# Patient Record
Sex: FEMALE | Race: WHITE | URBAN - METROPOLITAN AREA
[De-identification: names, ages, dates, MRNs, and addresses within clinical notes are randomized per-mention and may not be internally consistent; named-entity substitution may affect disease eponyms.]

---
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
## 2018-02-23 ENCOUNTER — TELEPHONE (OUTPATIENT)
Dept: FAMILY MEDICINE | Facility: CLINIC | Age: 18
End: 2018-02-23

## 2018-02-23 NOTE — TELEPHONE ENCOUNTER
Reason for Call:  Medication or medication refill:    Do you use a [facility]?  Name of the pharmacy and phone number for the current request:  [facility] - [phone]    Name of the medication requested: vyvance  40 mg    Other request: na    Can we leave a detailed message on this number? YES    Phone number patient can be reached at: Cell number on file:  [phone]  Telephone Information:   Mobile        Best Time: anytime,  Call Mom when ready for pickup    Call taken on 2/23/2018 at 2:23 PM by Massiel Abbott

## 2018-02-23 NOTE — TELEPHONE ENCOUNTER
Pt's Mother called stating that the patient would like to take Vyvanse again, states that the Adderall is not working well enough for the patient in college.  Mother staets that she believes that they spoke to Dr Sullivan about this last year    LOV 8/9/17    ASSESSMENT/PLAN:   Adilene was seen today for recheck medication.     Diagnoses and all orders for this visit:     Attention deficit hyperactivity disorder (ADHD), unspecified ADHD type  -     amphetamine-dextroamphetamine (ADDERALL) 20 MG per tablet; Take 1 tablet (20 mg) by mouth 2 times daily  -     amphetamine-dextroamphetamine (ADDERALL) 20 MG per tablet; Take 1 tablet (20 mg) by mouth 2 times daily  -     amphetamine-dextroamphetamine (ADDERALL) 20 MG per tablet; Take 1 tablet (20 mg) by mouth 2 times daily     after switching from vyvanse 10mg adderall not working and feeling wearing out easily, will have her tot ry 20mg adderall for next 4-6 months and recheck   Ok to refill via phone call request in 3 months      FUTURE APPOINTMENTS:       - Follow-up visit in 6 months      A total time of 27 minutes was spent with the patient today. Of this time More than 50% was spent counseling and coordinating of care of the above concerns.  .gilles advise on refill/restart of Vyvanse    Jory Hurst RN  [employer]

## 2018-02-25 NOTE — TELEPHONE ENCOUNTER
She is due for q3thaus med check anyhow. Please help her to schedule with me   I cannot change prescription  controlled substance without seeing patient

## 2018-02-26 NOTE — TELEPHONE ENCOUNTER
Mom notified with providers response and patient did schedule to establish care with female provider.  Daphney Goodrich RN - Triage  [facility]

## 2018-03-05 ENCOUNTER — OFFICE VISIT (OUTPATIENT)
Dept: FAMILY MEDICINE | Facility: CLINIC | Age: 18
End: 2018-03-05
Payer: COMMERCIAL

## 2018-03-05 VITALS
HEIGHT: 67 IN | BODY MASS INDEX: 22.29 KG/M2 | WEIGHT: 142 LBS | HEART RATE: 94 BPM | SYSTOLIC BLOOD PRESSURE: 104 MMHG | TEMPERATURE: 97.7 F | DIASTOLIC BLOOD PRESSURE: 54 MMHG

## 2018-03-05 DIAGNOSIS — F90.9 ATTENTION DEFICIT HYPERACTIVITY DISORDER (ADHD), UNSPECIFIED ADHD TYPE: ICD-10-CM

## 2018-03-05 DIAGNOSIS — Z79.899 CONTROLLED SUBSTANCE AGREEMENT SIGNED: Primary | ICD-10-CM

## 2018-03-05 DIAGNOSIS — Z30.011 OCP (ORAL CONTRACEPTIVE PILLS) INITIATION: ICD-10-CM

## 2018-03-05 PROCEDURE — 99214 OFFICE O/P EST MOD 30 MIN: CPT | Performed by: PHYSICIAN ASSISTANT

## 2018-03-05 RX ORDER — DROSPIRENONE AND ETHINYL ESTRADIOL 0.03MG-3MG
1 KIT ORAL DAILY
Qty: 90 TABLET | Refills: 3 | Status: CANCELLED | OUTPATIENT
Start: 2018-03-05

## 2018-03-05 RX ORDER — DEXTROAMPHETAMINE SACCHARATE, AMPHETAMINE ASPARTATE, DEXTROAMPHETAMINE SULFATE AND AMPHETAMINE SULFATE 5; 5; 5; 5 MG/1; MG/1; MG/1; MG/1
20 TABLET ORAL 2 TIMES DAILY
Qty: 60 TABLET | Refills: 0 | Status: CANCELLED | OUTPATIENT
Start: 2018-03-05

## 2018-03-05 RX ORDER — LISDEXAMFETAMINE DIMESYLATE 30 MG/1
30 CAPSULE ORAL EVERY MORNING
Qty: 30 CAPSULE | Refills: 0 | Status: SHIPPED | OUTPATIENT
Start: 2018-05-05 | End: 2018-05-22

## 2018-03-05 RX ORDER — LISDEXAMFETAMINE DIMESYLATE 30 MG/1
30 CAPSULE ORAL EVERY MORNING
Qty: 30 CAPSULE | Refills: 0 | Status: SHIPPED | OUTPATIENT
Start: 2018-03-05 | End: 2018-03-05

## 2018-03-05 RX ORDER — LISDEXAMFETAMINE DIMESYLATE 30 MG/1
30 CAPSULE ORAL EVERY MORNING
Qty: 30 CAPSULE | Refills: 0 | Status: SHIPPED | OUTPATIENT
Start: 2018-04-05 | End: 2018-05-22

## 2018-03-05 NOTE — NURSING NOTE
"Chief Complaint   Patient presents with     Establish Care     Recheck Medication       Initial /54 (BP Location: Left arm, Patient Position: Chair, Cuff Size: Adult Regular)  Pulse 94  Temp 97.7  F (36.5  C) (Tympanic)  Ht 5' 7.25\" (1.708 m)  Wt 142 lb (64.4 kg)  LMP 02/14/2018  BMI 22.08 kg/m2 Estimated body mass index is 22.08 kg/(m^2) as calculated from the following:    Height as of this encounter: 5' 7.25\" (1.708 m).    Weight as of this encounter: 142 lb (64.4 kg).  Medication Reconciliation: complete  "

## 2018-03-05 NOTE — LETTER
[facility]    [facility]18    Patient: Adilene Bergman  YOB: 2000  Medical Record Number: [id number]                                                                  Controlled Substance Agreement  I understand that my care provider has prescribed controlled substances (narcotics, tranquilizers, and/or stimulants) to help manage my condition(s).  I am taking this medicine to help me function or work.  I know that this is strong medicine.  It could have serious side effects and even cause a dependency on the drug.  If I stop these medicines suddenly, I could have severe withdrawal symptoms.    The risks, benefits, and side effects of these medication(s) were explained to me.  I agree that:  1. I will take part in other treatments as advised by my provider.  This may be psychiatry or counseling, physical therapy, behavioral therapy, group treatment, or a referral to a pain clinic.  I will reduce or stop my medicine when my provider tells me to do so.   2. I will take my medicines as prescribed.  I will not change the dose or schedule unless my provider tells me to.  There will be no refills if I  run out early.   I may be contacted at any time without warning and asked to complete a drug test or pill count.   3. I will keep all my appointments at the clinic.  If I miss appointments or fail to follow instructions, my provider may stop my medicine.  4. I will not ask other providers to prescribe controlled substances. And I will not accept controlled substances from other people. If I need another prescribed controlled substance for a new reason, I will notify my provider within one business day.  5. If I enroll in the [place] Medical Marijuana program, I will tell my provider.  I will also sign an agreement to share my medical records with my provider.  6. I will use one pharmacy to fill all of my controlled substance prescriptions.  If my prescription is mailed to my pharmacy, it may take  5 to 7 days for my medicine to be ready.  7. I understand that my provider, clinic care team, and pharmacy can track controlled substance prescriptions from other providers through a central database (prescription monitoring program).  8. I will bring in my list of medications (or my medicine bottles) each time I come to the clinic.  REV- 04/2016                                                                                                                                            Page 1 of 2      [facility] [place]    03/05/18    Patient: Adilene Bergman  YOB: 2000  Medical Record Number: [id number]    9. Refills of controlled substances will be made only during office hours.  It is up to me to make sure that I do not run out of my medicines on weekends or holidays.    10. I am responsible for my prescriptions.  If the medicine is lost or stolen, it will not be replaced.   I also agree not to share these medicines with anyone.  11. I agree to not use ANY illegal or recreational drugs.  This includes marijuana, cocaine, bath salts or other drugs.  I agree not to use alcohol unless my provider says I may.  I agree to give urine samples whenever asked.  If I fail to give a urine sample, the provider may stop my medicine.     12. I will tell my nurse or provider right away if I become pregnant or have a new medical problem treated outside of [facility].  13. I understand that this medicine can affect my thinking and judgment.  It may be unsafe for me to drive, use machinery and do dangerous tasks.  I will not do any of these things until I know how the medicine affects me.  If my dose changes, I will wait to see how it affects me.  I will contact my provider if I have concerns about medicine side effects.  I understand that if I do not follow any of the conditions above, my prescriptions or treatment may be stopped.    I agree that my provider, clinic care team, and pharmacy may work with  any city, state or federal law enforcement agency that investigates the misuse, sale, or other diversion of my controlled medicine. I will allow my provider to discuss my care with or share a copy of this agreement with any other treating provider, pharmacy or emergency room where I receive care.  I agree to give up (waive) any right of privacy or confidentiality with respect to these authorizations.   I have read this agreement and have asked questions about anything I did not understand.   ___________________________________    ___________________________  Patient Signature                                                           Date and Time  ___________________________________     ____________________________  Witness                                                                            Date and Time  ___________________________________  Scott Hopson PA-C  REV-  04/2016                                                                                                                                                                 Page 2 of 2

## 2018-03-05 NOTE — MR AVS SNAPSHOT
"              After Visit Summary   3/5/2018    Adilene Bergman    MRN: [id number]           Patient Information     Date Of Birth          2000        Visit Information        Provider Department      3/5/2018 10:40 AM Scott Hopson PA-C [facility] [place]        Today's Diagnoses     Controlled substance agreement signed    -  1    Attention deficit hyperactivity disorder (ADHD), unspecified ADHD type        OCP (oral contraceptive pills) initiation           Follow-ups after your visit        Follow-up notes from your care team     Return in about 6 months (around 9/5/2018) for Routine Visit.      Who to contact     If you have questions or need follow up information about today's clinic visit or your schedule please contact [facility] directly at [phone].  Normal or non-critical lab and imaging results will be communicated to you by MyChart, letter or phone within 4 business days after the clinic has received the results. If you do not hear from us within 7 days, please contact the clinic through MyChart or phone. If you have a critical or abnormal lab result, we will notify you by phone as soon as possible.  Submit refill requests through [facility] or call your pharmacy and they will forward the refill request to us. Please allow 3 business days for your refill to be completed.          Additional Information About Your Visit        MyChart Information     [facility] lets you send messages to your doctor, view your test results, renew your prescriptions, schedule appointments and more. To sign up, go to [email or web address] . Click on \"Log in\" on the left side of the screen, which will take you to the Welcome page. Then click on \"Sign up Now\" on the right side of the page.     You will be asked to enter the access code listed below, as well as some personal information. Please follow the directions to create your username and password.     Your access code is: " "[email or web address]  Expires: 6/3/2018 10:54 AM     Your access code will  in 90 days. If you need help or a new code, please call your [place] clinic or [phone].        Care EveryWhere ID     This is your Care EveryWhere ID. This could be used by other organizations to access your [facility] medical records  [id number]        Your Vitals Were     Pulse Temperature Height Last Period BMI (Body Mass Index)       94 97.7  F (36.5  C) (Tympanic) 5' 7.25\" (1.708 m) 2018 22.08 kg/m2        Blood Pressure from Last 3 Encounters:   18 104/54   17 110/60   17 101/65    Weight from Last 3 Encounters:   18 142 lb (64.4 kg) (73 %)*   17 143 lb (64.9 kg) (75 %)*   17 142 lb (64.4 kg) (75 %)*     * Growth percentiles are based on [facility] 2-20 Years data.              Today, you had the following     No orders found for display         Today's Medication Changes          These changes are accurate as of 3/5/18 10:54 AM.  If you have any questions, ask your nurse or doctor.               Start taking these medicines.        Dose/Directions    * lisdexamfetamine 30 MG capsule   Commonly known as:  VYVANSE   Used for:  Attention deficit hyperactivity disorder (ADHD), unspecified ADHD type   Started by:  Scott Hopson PA-C        Dose:  30 mg   Start taking on:  2018   Take 1 capsule (30 mg) by mouth every morning   Quantity:  30 capsule   Refills:  0       * lisdexamfetamine 30 MG capsule   Commonly known as:  VYVANSE   Used for:  Attention deficit hyperactivity disorder (ADHD), unspecified ADHD type   Started by:  Scott Hopson PA-C        Dose:  30 mg   Start taking on:  2018   Take 1 capsule (30 mg) by mouth every morning   Quantity:  30 capsule   Refills:  0       * Notice:  This list has 2 medication(s) that are the same as other medications prescribed for you. Read the directions carefully, and ask your doctor or other care provider to review them with you. "      Stop taking these medicines if you haven't already. Please contact your care team if you have questions.     amphetamine-dextroamphetamine 20 MG per tablet   Commonly known as:  ADDERALL   Stopped by:  Scott Hopson PA-C                Where to get your medicines      Some of these will need a paper prescription and others can be bought over the counter.  Ask your nurse if you have questions.     Bring a paper prescription for each of these medications     lisdexamfetamine 30 MG capsule    lisdexamfetamine 30 MG capsule                Primary Care Provider Office Phone # Fax #    Scott Hopson PA-C [phone]       [address]  [place] [address]        Equal Access to Services     [facility]: Hadii aad ku hadasho Soomaali, waaxda luqadaha, qaybta kaalmada adeegyada, waxay carliin pauletten ale harrell . So [facility] [phone].    ATENCIÓN: Si habla español, tiene a phan disposición servicios gratuitos de asistencia lingüística. Ll[facility] [phone].    We comply with applicable federal civil rights laws and [place] laws. We do not discriminate on the basis of race, color, national origin, age, disability, sex, sexual orientation, or gender identity.            Thank you!     Thank you for choosing [facility] [place]  for your care. Our goal is always to provide you with excellent care. Hearing back from our patients is one way we can continue to improve our services. Please take a few minutes to complete the written survey that you may receive in the mail after your visit with us. Thank you!             Your Updated Medication List - Protect others around you: Learn how to safely use, store and throw away your medicines at www.disposemymeds.org.          This list is accurate as of 3/5/18 10:54 AM.  Always use your most recent med list.                   Brand Name Dispense Instructions for use Diagnosis    drospirenone-ethinyl estradiol 3-0.03  MG per tablet    JIM    90 tablet    Take 1 tablet by mouth daily    OCP (oral contraceptive pills) initiation       * lisdexamfetamine 30 MG capsule   Start taking on:  4/5/2018    VYVANSE    30 capsule    Take 1 capsule (30 mg) by mouth every morning    Attention deficit hyperactivity disorder (ADHD), unspecified ADHD type       * lisdexamfetamine 30 MG capsule   Start taking on:  5/5/2018    VYVANSE    30 capsule    Take 1 capsule (30 mg) by mouth every morning    Attention deficit hyperactivity disorder (ADHD), unspecified ADHD type       * Notice:  This list has 2 medication(s) that are the same as other medications prescribed for you. Read the directions carefully, and ask your doctor or other care provider to review them with you.

## 2018-03-05 NOTE — PROGRESS NOTES
SUBJECTIVE:   Adilene Bergman is a 17 year old female who presents to clinic today for the following health issues:      Medication Followup of Adderall, but would like to go back to Vyvansse     Taking Medication as prescribed: yes    Side Effects:  Affects mood / personality - irritable     Medication Helping Symptoms:  yes     Adilene has a PMH ADHD diagnosed by a psychologist while in .  She is currently attending [employer] and is a freshman.  She is taking Adderall 20 mg BID , but feels that this makes her irritable and she does not sleep well since increasing the Adderall dose.  She was previously taking Vyvanse which she liked better, but her insurance changed at the time and she was switched to Adderall. She is interested in changing back to Vyvanse as she did not have SE with this medication.         Problem list and histories reviewed & adjusted, as indicated.  Additional history: as documented    Patient Active Problem List   Diagnosis     Headache     ADD (attention deficit disorder)     Loss of weight     Past Surgical History:   Procedure Laterality Date     NO HISTORY OF SURGERY         Social History   Substance Use Topics     Smoking status: Never Smoker     Smokeless tobacco: Never Used     Alcohol use No     Family History   Problem Relation Age of Onset     CEREBROVASCULAR DISEASE No family hx of      Hypertension Mother      Other - See Comments Brother      ADD          Current Outpatient Prescriptions   Medication Sig Dispense Refill     [START ON 4/5/2018] lisdexamfetamine (VYVANSE) 30 MG capsule Take 1 capsule (30 mg) by mouth every morning 30 capsule 0     [START ON 5/5/2018] lisdexamfetamine (VYVANSE) 30 MG capsule Take 1 capsule (30 mg) by mouth every morning 30 capsule 0     drospirenone-ethinyl estradiol (JIM) 3-0.03 MG per tablet Take 1 tablet by mouth daily 90 tablet 3     No Known Allergies    Reviewed and updated as needed this visit by clinical staff       Reviewed and  "updated as needed this visit by Provider         ROS:  Constitutional, HEENT, cardiovascular, pulmonary, gi and gu systems are negative, except as otherwise noted.    OBJECTIVE:     /54 (BP Location: Left arm, Patient Position: Chair, Cuff Size: Adult Regular)  Pulse 94  Temp 97.7  F (36.5  C) (Tympanic)  Ht 5' 7.25\" (1.708 m)  Wt 142 lb (64.4 kg)  LMP 02/14/2018  BMI 22.08 kg/m2  Body mass index is 22.08 kg/(m^2).  GENERAL: healthy, alert and no distress  EYES: Eyes grossly normal to inspection  PSYCH: mentation appears normal, affect normal/bright    Diagnostic Test Results:  none     ASSESSMENT/PLAN:     1. Attention deficit hyperactivity disorder (ADHD), unspecified ADHD type  - lisdexamfetamine (VYVANSE) 30 MG capsule; Take 1 capsule (30 mg) by mouth every morning  Dispense: 30 capsule; Refill: 0  - lisdexamfetamine (VYVANSE) 30 MG capsule; Take 1 capsule (30 mg) by mouth every morning  Dispense: 30 capsule; Refill: 0    2. Controlled substance agreement signed   Switched to Vyvanse 30 mg daily, tapering instruction for Adderall Discussed today with patient. CSA signed today, 6 months follow up needed, 3 month phone call for refill ok    3. OCP (oral contraceptive pills) initiation  Taking consistently, no SE        See Patient Instructions    Scott Hopson PA-C  [facility]  "

## 2018-04-05 ENCOUNTER — TELEPHONE (OUTPATIENT)
Dept: FAMILY MEDICINE | Facility: CLINIC | Age: 18
End: 2018-04-05

## 2018-04-05 NOTE — TELEPHONE ENCOUNTER
She may need to establish with an MD here or in [place] for these scripts.  I will check with my supervising physician when he returns next week

## 2018-04-05 NOTE — TELEPHONE ENCOUNTER
Patients parents are going to take prescription back to [place] and fill it here- they will then take it back to patient before she runs out of the current script.    please disregard the message- no need to rewrite the prescription.    Clementina Lewis RN BSN  [facility]  [phone]

## 2018-04-05 NOTE — TELEPHONE ENCOUNTER
Reason for Call:  Medication or medication refill:    Do you use a [facility]?  Name of the pharmacy and phone number for the current request:  hard copy    Name of the medication requested: Vyvanse    Other request: Mother, Helena calling as pt was given an rx for Vyvanse recently.  She goes to school in [place] and per their laws, they cannot fill narcotic prescriptions written by a SARTHAK.  They were given the hard copy back.  Asking if an MD can fill this.  Please advise.  They will turn in the hard copy if needed.      Can we leave a detailed message on this number? YES    Phone number patient can be reached at: Other phone number:  [phone]    Best Time:     Call taken on 4/5/2018 at 4:32 PM by Skyla Morrow

## 2018-05-22 ENCOUNTER — OFFICE VISIT (OUTPATIENT)
Dept: FAMILY MEDICINE | Facility: CLINIC | Age: 18
End: 2018-05-22
Payer: COMMERCIAL

## 2018-05-22 VITALS
BODY MASS INDEX: 22.08 KG/M2 | HEART RATE: 93 BPM | WEIGHT: 142 LBS | OXYGEN SATURATION: 98 % | DIASTOLIC BLOOD PRESSURE: 73 MMHG | TEMPERATURE: 99.2 F | SYSTOLIC BLOOD PRESSURE: 118 MMHG

## 2018-05-22 DIAGNOSIS — F90.9 ATTENTION DEFICIT HYPERACTIVITY DISORDER (ADHD), UNSPECIFIED ADHD TYPE: ICD-10-CM

## 2018-05-22 DIAGNOSIS — Z30.011 OCP (ORAL CONTRACEPTIVE PILLS) INITIATION: Primary | ICD-10-CM

## 2018-05-22 DIAGNOSIS — R35.0 URINARY FREQUENCY: ICD-10-CM

## 2018-05-22 LAB
ALBUMIN UR-MCNC: NEGATIVE MG/DL
APPEARANCE UR: ABNORMAL
BACTERIA #/AREA URNS HPF: ABNORMAL /HPF
BILIRUB UR QL STRIP: NEGATIVE
COLOR UR AUTO: YELLOW
GLUCOSE UR STRIP-MCNC: NEGATIVE MG/DL
HGB UR QL STRIP: NEGATIVE
KETONES UR STRIP-MCNC: NEGATIVE MG/DL
LEUKOCYTE ESTERASE UR QL STRIP: ABNORMAL
NITRATE UR QL: NEGATIVE
NON-SQ EPI CELLS #/AREA URNS LPF: ABNORMAL /LPF
PH UR STRIP: 6.5 PH (ref 5–7)
RBC #/AREA URNS AUTO: ABNORMAL /HPF
SOURCE: ABNORMAL
SP GR UR STRIP: 1.01 (ref 1–1.03)
UROBILINOGEN UR STRIP-ACNC: 0.2 EU/DL (ref 0.2–1)
WBC #/AREA URNS AUTO: ABNORMAL /HPF

## 2018-05-22 PROCEDURE — 87086 URINE CULTURE/COLONY COUNT: CPT | Performed by: INTERNAL MEDICINE

## 2018-05-22 PROCEDURE — 81001 URINALYSIS AUTO W/SCOPE: CPT | Performed by: INTERNAL MEDICINE

## 2018-05-22 PROCEDURE — 87186 SC STD MICRODIL/AGAR DIL: CPT | Performed by: INTERNAL MEDICINE

## 2018-05-22 PROCEDURE — 87088 URINE BACTERIA CULTURE: CPT | Performed by: INTERNAL MEDICINE

## 2018-05-22 PROCEDURE — 99214 OFFICE O/P EST MOD 30 MIN: CPT | Performed by: INTERNAL MEDICINE

## 2018-05-22 RX ORDER — NORGESTIMATE AND ETHINYL ESTRADIOL 0.25-0.035
1 KIT ORAL DAILY
Qty: 84 TABLET | Refills: 3 | Status: SHIPPED | OUTPATIENT
Start: 2018-05-22 | End: 2019-05-13

## 2018-05-22 RX ORDER — LISDEXAMFETAMINE DIMESYLATE 40 MG/1
40 CAPSULE ORAL DAILY
Qty: 30 CAPSULE | Refills: 0 | Status: SHIPPED | OUTPATIENT
Start: 2018-05-22 | End: 2018-06-21

## 2018-05-22 RX ORDER — LISDEXAMFETAMINE DIMESYLATE 40 MG/1
40 CAPSULE ORAL DAILY
Qty: 30 CAPSULE | Refills: 0 | Status: SHIPPED | OUTPATIENT
Start: 2018-06-22 | End: 2018-07-22

## 2018-05-22 RX ORDER — LISDEXAMFETAMINE DIMESYLATE 40 MG/1
40 CAPSULE ORAL DAILY
Qty: 30 CAPSULE | Refills: 0 | Status: SHIPPED | OUTPATIENT
Start: 2018-07-23 | End: 2018-08-22

## 2018-05-22 NOTE — PROGRESS NOTES
SUBJECTIVE:   Adilene Bergman is a 17 year old female who presents to clinic today for the following health issues:      Medication Followup of  marielena     Taking Medication as prescribed: yes    Side Effects:  Najera, sad     Medication Helping Symptoms:  yes       Adilene has been taking Marielena since November of 2017. Shortly after starting the pill she noticed some mood changes. She went through a hard break-up at that time as well so wasn't sure what symptoms were due to the OCP. She felt like she was much more irritable, however, and reacting more strongly.     On the other hand she does like that this has helped her menstrual cramping and the heaviness of her period. She thinks that she has had some nausea with this as well.     Has also been experiencing urinary urgency and some frequency. Currently not having symptoms. UA left today.    Finally, requesting a refill on her Vyanse. Previously took 40 mg but was switched to 30 mg due to insurance constraints. Felt like 40 mg worked better for her so hoping to go back. Denies side effects.        Problem list and histories reviewed & adjusted, as indicated.  Additional history: as documented    Patient Active Problem List   Diagnosis     Headache     ADD (attention deficit disorder)     Loss of weight     Past Surgical History:   Procedure Laterality Date     NO HISTORY OF SURGERY         Social History   Substance Use Topics     Smoking status: Never Smoker     Smokeless tobacco: Never Used     Alcohol use No     Family History   Problem Relation Age of Onset     CEREBROVASCULAR DISEASE No family hx of      Hypertension Mother      Other - See Comments Brother      ADD            Reviewed and updated as needed this visit by clinical staff       Reviewed and updated as needed this visit by Provider         ROS:  Constitutional, HEENT, cardiovascular, pulmonary, gi and gu systems are negative, except as otherwise noted.    OBJECTIVE:     /73 (BP Location: Left  arm, Cuff Size: Adult Regular)  Pulse 93  Temp 99.2  F (37.3  C) (Oral)  Wt 142 lb (64.4 kg)  LMP 04/26/2018  SpO2 98%  BMI 22.08 kg/m2  Body mass index is 22.08 kg/(m^2).  GENERAL: healthy, alert and no distress  RESP: lungs clear to auscultation - no rales, rhonchi or wheezes  CV: regular rate and rhythm, normal S1 S2, no S3 or S4, no murmur, click or rub, no peripheral edema and peripheral pulses strong  ABDOMEN: soft, nontender, no hepatosplenomegaly, no masses and bowel sounds normal  PSYCH: mentation appears normal, affect normal/bright    Diagnostic Test Results:  Results for orders placed or performed in visit on 05/22/18 (from the past 24 hour(s))   *UA reflex to Microscopic and Culture ([facility] (except [place] and [place])   Result Value Ref Range    Color Urine Yellow     Appearance Urine Slightly Cloudy     Glucose Urine Negative NEG^Negative mg/dL    Bilirubin Urine Negative NEG^Negative    Ketones Urine Negative NEG^Negative mg/dL    Specific Gravity Urine 1.010 1.003 - 1.035    Blood Urine Negative NEG^Negative    pH Urine 6.5 5.0 - 7.0 pH    Protein Albumin Urine Negative NEG^Negative mg/dL    Urobilinogen Urine 0.2 0.2 - 1.0 EU/dL    Nitrite Urine Negative NEG^Negative    Leukocyte Esterase Urine Trace (A) NEG^Negative    Source Midstream Urine    Urine Microscopic   Result Value Ref Range    WBC Urine 0 - 5 OTO5^0 - 5 /HPF    RBC Urine O - 2 OTO2^O - 2 /HPF    Squamous Epithelial /LPF Urine Few FEW^Few /LPF    Bacteria Urine Few (A) NEG^Negative /HPF       ASSESSMENT/PLAN:     1. OCP (oral contraceptive pills) initiation  Switching from Marielena to Ortho-Cyclen based on mood swings. Follow up in 3 months if no improvement.   - norgestimate-ethinyl estradiol (ORTHO-CYCLEN, SPRINTEC) 0.25-35 MG-MCG per tablet; Take 1 tablet by mouth daily  Dispense: 84 tablet; Refill: 3  - Urine Microscopic    2. Urinary frequency  Urine equivocal. Sending for a culture.  - *UA reflex to  Microscopic and Culture ([facility] (except [place] and [place])  - Urine Culture Aerobic Bacterial    3. Attention deficit hyperactivity disorder (ADHD), unspecified ADHD type  3 month refill of 40 mg given today.  - lisdexamfetamine (VYVANSE) 40 MG capsule; Take 1 capsule (40 mg) by mouth daily  Dispense: 30 capsule; Refill: 0  - lisdexamfetamine (VYVANSE) 40 MG capsule; Take 1 capsule (40 mg) by mouth daily  Dispense: 30 capsule; Refill: 0  - lisdexamfetamine (VYVANSE) 40 MG capsule; Take 1 capsule (40 mg) by mouth daily  Dispense: 30 capsule; Refill: 0    Follow up in 3 months if no improvement in mood swings, otherwise 6 months for ADHD follow up.     Becky Gallardo MD  [facility] [place]

## 2018-05-22 NOTE — MR AVS SNAPSHOT
"              After Visit Summary   5/22/2018    Adilene Bergamn    MRN: [id number]           Patient Information     Date Of Birth          2000        Visit Information        Provider Department      5/22/2018 3:40 PM Becky Gallardo MD [facility]        Today's Diagnoses     OCP (oral contraceptive pills) initiation    -  1    Urinary frequency        Attention deficit hyperactivity disorder (ADHD), unspecified ADHD type           Follow-ups after your visit        Follow-up notes from your care team     Return in about 6 months (around 11/22/2018) for Medication Follow up.      Who to contact     If you have questions or need follow up information about today's clinic visit or your schedule please contact [facility] directly at [phone].  Normal or non-critical lab and imaging results will be communicated to you by MyChart, letter or phone within 4 business days after the clinic has received the results. If you do not hear from us within 7 days, please contact the clinic through MyChart or phone. If you have a critical or abnormal lab result, we will notify you by phone as soon as possible.  Submit refill requests through [employer] or call your pharmacy and they will forward the refill request to us. Please allow 3 business days for your refill to be completed.          Additional Information About Your Visit        MyChart Information     [employer]Line lets you send messages to your doctor, view your test results, renew your prescriptions, schedule appointments and more. To sign up, go to [email or web address] . Click on \"Log in\" on the left side of the screen, which will take you to the Welcome page. Then click on \"Sign up Now\" on the right side of the page.     You will be asked to enter the access code listed below, as well as some personal information. Please follow the directions to create your username and password.     Your access code is: [email or web address]  Expires: " 6/3/2018 11:54 AM     Your access code will  in 90 days. If you need help or a new code, please call your [facility] clinic or [phone].        Care EveryWhere ID     This is your Care EveryWhere ID. This could be used by other organizations to access your [facility] medical records  [id number]        Your Vitals Were     Pulse Temperature Last Period Pulse Oximetry BMI (Body Mass Index)       93 99.2  F (37.3  C) (Oral) 2018 98% 22.08 kg/m2        Blood Pressure from Last 3 Encounters:   18 118/73   18 104/54   17 110/60    Weight from Last 3 Encounters:   18 142 lb (64.4 kg) (72 %)*   18 142 lb (64.4 kg) (73 %)*   17 143 lb (64.9 kg) (75 %)*     * Growth percentiles are based on [facility] 2-20 Years data.              We Performed the Following     *UA reflex to Microscopic and Culture ([place] [facility] (except [place] and [place])     Urine Culture Aerobic Bacterial     Urine Microscopic          Today's Medication Changes          These changes are accurate as of 18  4:21 PM.  If you have any questions, ask your nurse or doctor.               Start taking these medicines.        Dose/Directions    norgestimate-ethinyl estradiol 0.25-35 MG-MCG per tablet   Commonly known as:  ORTHO-CYCLEN, SPRINTEC   Used for:  OCP (oral contraceptive pills) initiation   Started by:  Becky Gallardo MD        Dose:  1 tablet   Take 1 tablet by mouth daily   Quantity:  84 tablet   Refills:  3         These medicines have changed or have updated prescriptions.        Dose/Directions    * lisdexamfetamine 40 MG capsule   Commonly known as:  VYVANSE   This may have changed:    - medication strength  - how much to take  - when to take this   Used for:  Attention deficit hyperactivity disorder (ADHD), unspecified ADHD type   Changed by:  Becky Gallardo MD        Dose:  40 mg   Take 1 capsule (40 mg) by mouth daily   Quantity:  30 capsule   Refills:  0       *  lisdexamfetamine 40 MG capsule   Commonly known as:  VYVANSE   This may have changed:  You were already taking a medication with the same name, and this prescription was added. Make sure you understand how and when to take each.   Used for:  Attention deficit hyperactivity disorder (ADHD), unspecified ADHD type   Changed by:  Becky Gallardo MD        Dose:  40 mg   Start taking on:  6/22/2018   Take 1 capsule (40 mg) by mouth daily   Quantity:  30 capsule   Refills:  0       * lisdexamfetamine 40 MG capsule   Commonly known as:  VYVANSE   This may have changed:  You were already taking a medication with the same name, and this prescription was added. Make sure you understand how and when to take each.   Used for:  Attention deficit hyperactivity disorder (ADHD), unspecified ADHD type   Changed by:  Becky Gallardo MD        Dose:  40 mg   Start taking on:  7/23/2018   Take 1 capsule (40 mg) by mouth daily   Quantity:  30 capsule   Refills:  0       * Notice:  This list has 3 medication(s) that are the same as other medications prescribed for you. Read the directions carefully, and ask your doctor or other care provider to review them with you.      Stop taking these medicines if you haven't already. Please contact your care team if you have questions.     drospirenone-ethinyl estradiol 3-0.03 MG per tablet   Commonly known as:  JIM   Stopped by:  Becky Gallardo MD                Where to get your medicines      These medications were sent to [facility]/pharmacy #[id number] [place], [place] - [address]  [address] [address]     Phone:  [phone]     norgestimate-ethinyl estradiol 0.25-35 MG-MCG per tablet         Some of these will need a paper prescription and others can be bought over the counter.  Ask your nurse if you have questions.     Bring a paper prescription for each of these medications     lisdexamfetamine 40 MG capsule    lisdexamfetamine 40 MG capsule     lisdexamfetamine 40 MG capsule                Primary Care Provider Office Phone # Fax #    Scott Hopson PA-C [phone]       [address]  [place] [address]        Equal Access to Services     [facility]: Hadii aad ku hadasho Soomaali, waaxda luqadaha, qaybta kaalmada adeegyada, waxay idiin hayaan adeeg kharash la'aan ah. So [facility] [phone].    ATENCIÓN: Si habla español, tiene a phan disposición servicios gratuitos de asistencia lingüística. Llame al [phone].    We comply with applicable federal civil rights laws and [place] laws. We do not discriminate on the basis of race, color, national origin, age, disability, sex, sexual orientation, or gender identity.            Thank you!     Thank you for choosing [facility] [place]  for your care. Our goal is always to provide you with excellent care. Hearing back from our patients is one way we can continue to improve our services. Please take a few minutes to complete the written survey that you may receive in the mail after your visit with us. Thank you!             Your Updated Medication List - Protect others around you: Learn how to safely use, store and throw away your medicines at www.disposemymeds.org.          This list is accurate as of 5/22/18  4:21 PM.  Always use your most recent med list.                   Brand Name Dispense Instructions for use Diagnosis    * lisdexamfetamine 40 MG capsule    VYVANSE    30 capsule    Take 1 capsule (40 mg) by mouth daily    Attention deficit hyperactivity disorder (ADHD), unspecified ADHD type       * lisdexamfetamine 40 MG capsule   Start taking on:  6/22/2018    VYVANSE    30 capsule    Take 1 capsule (40 mg) by mouth daily    Attention deficit hyperactivity disorder (ADHD), unspecified ADHD type       * lisdexamfetamine 40 MG capsule   Start taking on:  7/23/2018    VYVANSE    30 capsule    Take 1 capsule (40 mg) by mouth daily    Attention deficit  hyperactivity disorder (ADHD), unspecified ADHD type       norgestimate-ethinyl estradiol 0.25-35 MG-MCG per tablet    ORTHO-CYCLEN, SPRINTEC    84 tablet    Take 1 tablet by mouth daily    OCP (oral contraceptive pills) initiation       * Notice:  This list has 3 medication(s) that are the same as other medications prescribed for you. Read the directions carefully, and ask your doctor or other care provider to review them with you.

## 2018-05-23 LAB
BACTERIA SPEC CULT: ABNORMAL
BACTERIA SPEC CULT: ABNORMAL
SPECIMEN SOURCE: ABNORMAL

## 2018-05-24 ENCOUNTER — TELEPHONE (OUTPATIENT)
Dept: FAMILY MEDICINE | Facility: CLINIC | Age: 18
End: 2018-05-24

## 2018-05-24 DIAGNOSIS — N30.90 BLADDER INFECTION: Primary | ICD-10-CM

## 2018-05-24 RX ORDER — CEPHALEXIN 500 MG/1
500 CAPSULE ORAL 2 TIMES DAILY
Qty: 10 CAPSULE | Refills: 0 | Status: SHIPPED | OUTPATIENT
Start: 2018-05-24 | End: 2019-05-13

## 2018-05-24 NOTE — TELEPHONE ENCOUNTER
Patient mom notified per consent to communicate on file with the information noted below from provider and agrees with plan.  Daphney Goodrich RN - Triage  [facility]

## 2018-05-24 NOTE — TELEPHONE ENCOUNTER
Please let Adilene know that her urine culture did grow back a small amount of bacteria. Given her intermittent symptoms I think it would be worth treating. I am sending her a prescription for Keflex to take twice daily x 5 days.    Script sent to [facility].

## 2018-09-07 ENCOUNTER — TELEPHONE (OUTPATIENT)
Dept: FAMILY MEDICINE | Facility: CLINIC | Age: 18
End: 2018-09-07

## 2018-09-07 DIAGNOSIS — F90.9 ATTENTION DEFICIT HYPERACTIVITY DISORDER (ADHD), UNSPECIFIED ADHD TYPE: ICD-10-CM

## 2018-09-07 RX ORDER — LISDEXAMFETAMINE DIMESYLATE 40 MG/1
40 CAPSULE ORAL DAILY
Qty: 30 CAPSULE | Refills: 0 | Status: CANCELLED | OUTPATIENT
Start: 2018-09-07

## 2018-09-07 NOTE — TELEPHONE ENCOUNTER
Vyvanse--pt is back in [place] for school so is asking if we can do 3 hard copy rxs that her mother Helena will  in clinic.  Adilene states she will be filling these at the pharmacy on campus at [facility] as [place] will not honor controlled rxs from out of state.  [facility] does require an MD to be the one to sign rxs, cannot be a PA-C.  Adilene can be reached at [phone] when ready at .  Consent already on file for mother.     Last Written Prescription Date:  7/23/2018  Last Fill Quantity: 30,   # refills: 0  Last Office Visit: 5/22/2018  Future Office visit:       Routing refill request to provider for review/approval because:  Drug not on the [facility], [facility] or [facility] refill protocol or controlled substance

## 2018-09-10 RX ORDER — LISDEXAMFETAMINE DIMESYLATE 40 MG/1
40 CAPSULE ORAL DAILY
Qty: 30 CAPSULE | Refills: 0 | Status: SHIPPED | OUTPATIENT
Start: 2018-09-10 | End: 2018-10-10

## 2018-09-10 RX ORDER — LISDEXAMFETAMINE DIMESYLATE 40 MG/1
40 CAPSULE ORAL DAILY
Qty: 30 CAPSULE | Refills: 0 | Status: SHIPPED | OUTPATIENT
Start: 2018-11-11 | End: 2018-12-11

## 2018-09-10 RX ORDER — LISDEXAMFETAMINE DIMESYLATE 40 MG/1
40 CAPSULE ORAL DAILY
Qty: 30 CAPSULE | Refills: 0 | Status: SHIPPED | OUTPATIENT
Start: 2018-10-11 | End: 2018-11-10

## 2018-10-31 NOTE — TELEPHONE ENCOUNTER
S/w mom and bret md reply below.  Mom states understanding    S/w pharmacist who states they will allow early refill this time per insurance.  Pharmacy will get medication ready for .    Advised mom of insurance approving this early refill.    Sindhu PATTON RN  EP Triage

## 2018-10-31 NOTE — TELEPHONE ENCOUNTER
Pt is on vyvanse and going to school in [place].  [place] has a state law will not fill controlled substances from out of state md.  Mom has been filling rx here and shipping by [employer] monthly.  Mom is going to [place] this weekend and would like to  rx early and bring with her.  Pharmacy told mom the earliest she can  is Saturday 11/3.  Mom is leaving Friday 11/2.    S/w pharmacist [facility] who states mom last picked up rx on 10/7/18 and the pharmacy allows a couple of days [address] which would be 11/3.  If wants to  sooner needs permission from md and reason why picking up earlier.    Message to Dr. Gallardo:  Ok for mom to  vyvanse early and bring to pt?    Pharmacy can be reached at [phone] [facility] EP.    Mom can be reached at [phone].      Sindhu PATTON RN  EP Triage

## 2018-10-31 NOTE — TELEPHONE ENCOUNTER
Mother is traveling to [place] to see patient on Friday.  She would like to pick this medication up prior to that from [facility] prior to leaving to bring this to the patient.  The pharmacy is telling her she can not pick this up early without provider approval.    Mother states patient is not out of medication, she is just trying to save money on shipping costs.  Please call approval or advise.    Mother Helena phone [phone], ok to leave detailed message.    Cecelia Markham

## 2019-01-11 ENCOUNTER — MYC MEDICAL ADVICE (OUTPATIENT)
Dept: FAMILY MEDICINE | Facility: CLINIC | Age: 19
End: 2019-01-11

## 2019-01-11 DIAGNOSIS — F90.2 ATTENTION DEFICIT HYPERACTIVITY DISORDER (ADHD), COMBINED TYPE: Primary | ICD-10-CM

## 2019-01-14 ENCOUNTER — E-VISIT (OUTPATIENT)
Dept: FAMILY MEDICINE | Facility: CLINIC | Age: 19
End: 2019-01-14
Payer: COMMERCIAL

## 2019-01-14 DIAGNOSIS — J02.9 SORE THROAT: Primary | ICD-10-CM

## 2019-01-14 RX ORDER — LISDEXAMFETAMINE DIMESYLATE 40 MG/1
40 CAPSULE ORAL DAILY
Qty: 30 CAPSULE | Refills: 0 | Status: SHIPPED | OUTPATIENT
Start: 2019-03-17 | End: 2019-04-16

## 2019-01-14 RX ORDER — LISDEXAMFETAMINE DIMESYLATE 40 MG/1
40 CAPSULE ORAL DAILY
Qty: 30 CAPSULE | Refills: 0 | Status: SHIPPED | OUTPATIENT
Start: 2019-01-14 | End: 2019-05-13

## 2019-01-14 RX ORDER — LISDEXAMFETAMINE DIMESYLATE 40 MG/1
40 CAPSULE ORAL DAILY
Qty: 30 CAPSULE | Refills: 0 | Status: SHIPPED | OUTPATIENT
Start: 2019-02-14 | End: 2019-05-13

## 2019-01-14 NOTE — TELEPHONE ENCOUNTER
Please see [employer] message and advise.     Routing to PCP for further review/recommendations/orders.      Kailee STEWARDN, RN   [facility]

## 2019-01-14 NOTE — TELEPHONE ENCOUNTER
Pt notified of md reply per conversation with nurse via my chart.    Sindhu PATTON RN  EP Triage

## 2019-01-14 NOTE — TELEPHONE ENCOUNTER
3 months of Vyvanse 40 mg written for. Please notify patient and also help her to schedule a f/u office visit in 3 months.

## 2019-01-15 NOTE — TELEPHONE ENCOUNTER
3 Prescriptions for vyvanse at the  for . My Chart message sent to the pt and also to schedule a med check in 3 months before she is due for her next refill.  Barbie De Leon,

## 2019-01-23 NOTE — TELEPHONE ENCOUNTER
RX picked up by mother on 01/23/2019.    .Kathleen HUNG    [facility] [place]

## 2019-05-13 ENCOUNTER — OFFICE VISIT (OUTPATIENT)
Dept: FAMILY MEDICINE | Facility: CLINIC | Age: 19
End: 2019-05-13
Payer: COMMERCIAL

## 2019-05-13 VITALS
RESPIRATION RATE: 16 BRPM | HEART RATE: 76 BPM | TEMPERATURE: 97.5 F | BODY MASS INDEX: 21.3 KG/M2 | DIASTOLIC BLOOD PRESSURE: 72 MMHG | SYSTOLIC BLOOD PRESSURE: 118 MMHG | OXYGEN SATURATION: 97 % | WEIGHT: 137 LBS

## 2019-05-13 DIAGNOSIS — Z11.3 SCREEN FOR STD (SEXUALLY TRANSMITTED DISEASE): ICD-10-CM

## 2019-05-13 DIAGNOSIS — Z79.899 CONTROLLED SUBSTANCE AGREEMENT SIGNED: ICD-10-CM

## 2019-05-13 DIAGNOSIS — F90.9 ATTENTION DEFICIT HYPERACTIVITY DISORDER (ADHD), UNSPECIFIED ADHD TYPE: Primary | ICD-10-CM

## 2019-05-13 PROCEDURE — 87491 CHLMYD TRACH DNA AMP PROBE: CPT | Performed by: PHYSICIAN ASSISTANT

## 2019-05-13 PROCEDURE — 99213 OFFICE O/P EST LOW 20 MIN: CPT | Performed by: PHYSICIAN ASSISTANT

## 2019-05-13 PROCEDURE — 87591 N.GONORRHOEAE DNA AMP PROB: CPT | Performed by: PHYSICIAN ASSISTANT

## 2019-05-13 RX ORDER — LISDEXAMFETAMINE DIMESYLATE 40 MG/1
40 CAPSULE ORAL DAILY
Qty: 30 CAPSULE | Refills: 0 | Status: SHIPPED | OUTPATIENT
Start: 2019-06-13 | End: 2020-07-27

## 2019-05-13 RX ORDER — LISDEXAMFETAMINE DIMESYLATE 40 MG/1
40 CAPSULE ORAL DAILY
Qty: 30 CAPSULE | Refills: 0 | Status: SHIPPED | OUTPATIENT
Start: 2019-07-13 | End: 2019-08-28

## 2019-05-13 RX ORDER — LISDEXAMFETAMINE DIMESYLATE 40 MG/1
40 CAPSULE ORAL DAILY
Qty: 30 CAPSULE | Refills: 0 | Status: SHIPPED | OUTPATIENT
Start: 2019-05-13 | End: 2019-05-13

## 2019-05-13 NOTE — LETTER
[facility]  [place]13/19    Patient: Adilene Bergman  YOB: 2000  Medical Record Number: [id number]  CSN: [id number]                                                                              Non-opioid Controlled Substance Agreement    I understand that my care provider has prescribed a controlled substance to help manage my condition(s). I am taking this medicine to help me function or work. I know this is strong medicine, and that it can cause serious side effects. Controlled substances can be sedating, addicting and may cause a dependency on the drug. They can affect my ability to drive or think, and cause depression. They need to be taken exactly as prescribed. Combining controlled substances with certain medicines or chemicals (such as cocaine, sedatives and tranquilizers, sleeping pills, meth) can be dangerous or even fatal. Also, if I stop controlled substances suddenly, I may have severe withdrawal symptoms.  If not helpful, I may be asked to stop them.    The risks, benefits, and side effects of these medicine(s) were explained to me. I agree that:    1. I will take part in other treatments as advised by my care team. This may be psychiatry or counseling, physical therapy, behavioral therapy, group treatment or a referral to a pain clinic. I will reduce or stop my medicine when my care team tells me to do so.  2. I will take my medicines as prescribed. I will not change the dose or schedule unless my care team tells me to. There will be no refills if I  run out early.   I may be contactedwithout warning and asked to complete a urine drug test or pill count at any time.   3. I will keep all my appointments, and understand this is part of the monitoring of controlled substances. My care team may require an office visit for EVERY controlled substance refill. If I miss appointments or don t follow instructions, my care team may stop my medicine.  4. I will not ask other providers to  prescribe controlled substances, and I will not accept controlled substances from other people. If I need another prescribed controlled substance for a new reason, I will tell my care team within 1 business day.  5. I will use one pharmacy to fill all of my controlled substance prescriptions, and it is up to me to make sure that I do not run out of my medicines on weekends or holidays. If my care team is willing to refill my controlled substance prescription without a visit, I must request refills only during office hours, refills may take up to 3 days to process, and it may take up to 5 to 7 days for my medicine to be mailed and ready at my pharmacy. Prescriptions will not be mailed anywhere except my pharmacy.    6. I am responsible for my prescriptions. If the medicine/prescription is lost or stolen, it will not be replaced. I also agree not to share controlled substance medicines with anyone.              [facility]  [place]  Patient:  Adilene Bergman  YOB: 2000  Medical Record Number: [id number]  CSN: [id number]    7. I agree to not use ANY illegal or recreational drugs. This includes marijuana, cocaine, bath salts or other drugs. I agree not to use alcohol unless my care team says I may. I agree to give urine samples whenever asked. If I don t give a urine sample, the care team may stop my medicine.    8. If I enroll in the [place] Medical Marijuana program, I will tell my care team. I will also sign an agreement to share my medical records with my care team.    9. I will bring in my list of medicines (or my medicine bottles) each time I come to the clinic.   10. I will tell my care team right away if I become pregnant or have a new medical problem treated outside of my regular clinic.  11. I understand that this medicine can affect my thinking and judgment. It may be unsafe for me to drive, use machinery and do dangerous tasks. I will not do any of these things until I know how  the medicine affects me. If my dose changes, I will wait to see how it affects me. I will contact my care team if I have concerns about medicine side effects.    I understand that if I do not follow any of the conditions above, my prescriptions or treatment may be stopped.      I agree that my provider, clinic care team, and pharmacy may work with any city, state or federal law enforcement agency that investigates the misuse, sale, or other diversion of my controlled medicine. I will allow my provider to discuss my care with or share a copy of this agreement with any other treating provider, pharmacy or emergency room where I receive care. I agree to give up (waive) any right of privacy or confidentiality with respect to these consents.   I have read this agreement and have asked questions about anything I did not understand.    ____________________________________________________    ____________  ________  Patient signature                                                         Date      Time    ____________________________________________________     ____________  ________  Witness                                                          Date      Time    ____________________________________________________  Provider signature

## 2019-05-13 NOTE — PROGRESS NOTES
SUBJECTIVE:   Adilene Bergman is a 18 year old female who presents to clinic today for the following   health issues:        Medication Followup of Vyvannse    Taking Medication as prescribed: yes    Side Effects:  None    Medication Helping Symptoms:  yes     Adilene has been taking Vyvanse 40 mg daily for ADHD.  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 Vyvanse is helping her concentration and organization in class. She is not having any SE of this medication.  Due for refills today          Additional history: as documented    Reviewed  and updated as needed this visit by clinical staff         Reviewed and updated as needed this visit by Provider         Patient Active Problem List   Diagnosis     Headache     ADD (attention deficit disorder)     Loss of weight     Past Surgical History:   Procedure Laterality Date     NO HISTORY OF SURGERY         Social History     Tobacco Use     Smoking status: Never Smoker     Smokeless tobacco: Never Used   Substance Use Topics     Alcohol use: No     Alcohol/week: 0.0 oz     Family History   Problem Relation Age of Onset     Cerebrovascular Disease No family hx of      Hypertension Mother      Other - See Comments Brother         ADD          Current Outpatient Medications   Medication Sig Dispense Refill     [START ON 6/13/2019] lisdexamfetamine (VYVANSE) 40 MG capsule Take 1 capsule (40 mg) by mouth daily 30 capsule 0     [START ON 7/13/2019] lisdexamfetamine (VYVANSE) 40 MG capsule Take 1 capsule (40 mg) by mouth daily 30 capsule 0     No Known Allergies    ROS:  Constitutional, HEENT, cardiovascular, pulmonary, gi and gu systems are negative, except as otherwise noted.    OBJECTIVE:     /72   Pulse 76   Temp 97.5  F (36.4  C) (Tympanic)   Resp 16   Wt 62.1 kg (137 lb)   LMP 04/29/2019   SpO2 97%   BMI 21.30 kg/m    Body mass index is 21.3 kg/m .  GENERAL: healthy, alert and no distress  RESP: lungs clear to auscultation - no rales,  rhonchi or wheezes  CV: regular rate and rhythm, normal S1 S2, no S3 or S4, no murmur    Diagnostic Test Results:  none     ASSESSMENT/PLAN:       1. Attention deficit hyperactivity disorder (ADHD), unspecified ADHD type  Vyvanse scripts x 3 printed today.  She will be due in 6 months for OV follow up.     2. Controlled substance agreement signed  Re-signed today    3. Screen for STD (sexually transmitted disease)  - NEISSERIA GONORRHOEA PCR  - CHLAMYDIA TRACHOMATIS PCR    Follow-Up: 6 months    Scott Hopson PA-C  [facility]

## 2019-05-14 LAB
C TRACH DNA SPEC QL NAA+PROBE: NEGATIVE
N GONORRHOEA DNA SPEC QL NAA+PROBE: NEGATIVE
SPECIMEN SOURCE: NORMAL
SPECIMEN SOURCE: NORMAL

## 2019-05-16 NOTE — RESULT ENCOUNTER NOTE
Adilene-  Here are your recent results.      -Chlamydia and gonnohrea tests are normal.    If you have any questions please do not hesitate to contact our office via phone ([phone]) or you may send me a message via [employer] by clicking the contact my Care Team link.      It was a pleasure participating in your care!    Thank you,    Scott Hopson MPH, PA-C  [address] [place] [address] [phone]

## 2019-08-25 ENCOUNTER — MYC MEDICAL ADVICE (OUTPATIENT)
Dept: FAMILY MEDICINE | Facility: CLINIC | Age: 19
End: 2019-08-25

## 2019-08-25 DIAGNOSIS — F90.9 ATTENTION DEFICIT HYPERACTIVITY DISORDER (ADHD), UNSPECIFIED ADHD TYPE: Primary | ICD-10-CM

## 2019-08-26 NOTE — TELEPHONE ENCOUNTER
Requested Prescriptions   Pending Prescriptions Disp Refills     lisdexamfetamine (VYVANSE) 40 MG capsule 30 capsule 0     Sig: Take 1 capsule (40 mg) by mouth daily       There is no refill protocol information for this order        Controlled Substance Refill Request for lisdexamfetamine (VYVANSE) 40 MG capsule  Problem List Complete:  No     PROVIDER TO CONSIDER COMPLETION OF PROBLEM LIST AND OVERVIEW/CONTROLLED SUBSTANCE AGREEMENT    Last Written Prescription Date:  7-  Last Fill Quantity: 30 capsule,   # refills: 0    THE MOST RECENT OFFICE VISIT MUST BE WITHIN THE PAST 3 MONTHS. AT LEAST ONE FACE TO FACE VISIT MUST OCCUR EVERY 6 MONTHS. ADDITIONAL VISITS CAN BE VIRTUAL.  (THIS STATEMENT SHOULD BE DELETED.)    [place] [facility] [place]: 5- Raile    Future Office visit:     Controlled substance agreement:   Encounter-Level CSA - 03/05/2018:    Controlled Substance Agreement - Scan on 3/7/2018  9:59 AM: CONTROLLED SUBSTANCE AGREEMENT (below)       Patient-Level CSA:    Controlled Substance Agreement - Non - Opioid - Scan on 6/5/2019  7:49 AM: NON-OPIOID CONTROLLED SUBSTANCE AGREEMENT (below)           Last Urine Drug Screen: No results found for: CDAUT, No results found for: COMDAT, No results found for: THC13, PCP13, COC13, MAMP13, OPI13, AMP13, BZO13, TCA13, MTD13, BAR13, OXY13, PPX13, BUP13     Processing:  Patient will  in clinic     [email or web address]       checked in past 3 months?  No, route to RN

## 2019-08-28 RX ORDER — LISDEXAMFETAMINE DIMESYLATE 40 MG/1
40 CAPSULE ORAL DAILY
Qty: 30 CAPSULE | Refills: 0 | Status: SHIPPED | OUTPATIENT
Start: 2019-08-28 | End: 2020-07-27

## 2019-09-18 ENCOUNTER — E-VISIT (OUTPATIENT)
Dept: FAMILY MEDICINE | Facility: CLINIC | Age: 19
End: 2019-09-18

## 2019-09-18 DIAGNOSIS — Z53.9 ERRONEOUS ENCOUNTER--DISREGARD: Primary | ICD-10-CM

## 2019-09-18 ASSESSMENT — PATIENT HEALTH QUESTIONNAIRE - PHQ9
SUM OF ALL RESPONSES TO PHQ QUESTIONS 1-9: 22
SUM OF ALL RESPONSES TO PHQ QUESTIONS 1-9: 22
10. IF YOU CHECKED OFF ANY PROBLEMS, HOW DIFFICULT HAVE THESE PROBLEMS MADE IT FOR YOU TO DO YOUR WORK, TAKE CARE OF THINGS AT HOME, OR GET ALONG WITH OTHER PEOPLE: EXTREMELY DIFFICULT

## 2019-09-19 ASSESSMENT — PATIENT HEALTH QUESTIONNAIRE - PHQ9: SUM OF ALL RESPONSES TO PHQ QUESTIONS 1-9: 22

## 2019-10-02 ENCOUNTER — MYC REFILL (OUTPATIENT)
Dept: FAMILY MEDICINE | Facility: CLINIC | Age: 19
End: 2019-10-02

## 2019-10-02 DIAGNOSIS — F90.9 ATTENTION DEFICIT HYPERACTIVITY DISORDER (ADHD), UNSPECIFIED ADHD TYPE: ICD-10-CM

## 2019-10-02 RX ORDER — LISDEXAMFETAMINE DIMESYLATE 40 MG/1
40 CAPSULE ORAL EVERY MORNING
Qty: 30 CAPSULE | Refills: 0 | OUTPATIENT
Start: 2019-10-02

## 2019-10-02 NOTE — TELEPHONE ENCOUNTER
Controlled Substance Refill Request for lisdexamfetamine (VYVANSE) 40 MG capsule  (appears to be duplicate request)  Problem List Complete:  Yes    Last Written Prescription Date:  10-1-19  Last Fill Quantity: 30,   # refills: 0    THE MOST RECENT OFFICE VISIT MUST BE WITHIN THE PAST 3 MONTHS. AT LEAST ONE FACE TO FACE VISIT MUST OCCUR EVERY 6 MONTHS. ADDITIONAL VISITS CAN BE VIRTUAL.  (THIS STATEMENT SHOULD BE DELETED.)    [place] [facility] [place] 5-13-19 with CHERELLE Hopson    Future Office visit:     Controlled substance agreement:   Encounter-Level CSA - 03/05/2018:    Controlled Substance Agreement - Scan on 3/7/2018  9:59 AM: CONTROLLED SUBSTANCE AGREEMENT     Patient-Level CSA:    Controlled Substance Agreement - Non - Opioid - Scan on 6/5/2019  7:49 AM: NON-OPIOID CONTROLLED SUBSTANCE AGREEMENT         Last Urine Drug Screen: No results found for: CDAUT, No results found for: COMDAT, No results found for: THC13, PCP13, COC13, MAMP13, OPI13, AMP13, BZO13, TCA13, MTD13, BAR13, OXY13, PPX13, BUP13     Processing:  Fax Rx to [id number] [place] - [address]  pharmacy    [email or web address]   checked in past 3 months?  Yes 9-30-19

## 2019-10-13 ENCOUNTER — E-VISIT (OUTPATIENT)
Dept: FAMILY MEDICINE | Facility: CLINIC | Age: 19
End: 2019-10-13
Payer: COMMERCIAL

## 2019-10-13 DIAGNOSIS — F32.1 CURRENT MODERATE EPISODE OF MAJOR DEPRESSIVE DISORDER WITHOUT PRIOR EPISODE (H): ICD-10-CM

## 2019-10-13 PROCEDURE — 99444 ZZC PHYSICIAN ONLINE EVALUATION & MANAGEMENT SERVICE: CPT | Performed by: INTERNAL MEDICINE

## 2019-10-13 ASSESSMENT — PATIENT HEALTH QUESTIONNAIRE - PHQ9
10. IF YOU CHECKED OFF ANY PROBLEMS, HOW DIFFICULT HAVE THESE PROBLEMS MADE IT FOR YOU TO DO YOUR WORK, TAKE CARE OF THINGS AT HOME, OR GET ALONG WITH OTHER PEOPLE: SOMEWHAT DIFFICULT
SUM OF ALL RESPONSES TO PHQ QUESTIONS 1-9: 5
SUM OF ALL RESPONSES TO PHQ QUESTIONS 1-9: 5

## 2019-10-14 ASSESSMENT — PATIENT HEALTH QUESTIONNAIRE - PHQ9: SUM OF ALL RESPONSES TO PHQ QUESTIONS 1-9: 5

## 2019-10-29 ENCOUNTER — MYC REFILL (OUTPATIENT)
Dept: FAMILY MEDICINE | Facility: CLINIC | Age: 19
End: 2019-10-29

## 2019-10-29 DIAGNOSIS — F90.9 ATTENTION DEFICIT HYPERACTIVITY DISORDER (ADHD), UNSPECIFIED ADHD TYPE: ICD-10-CM

## 2019-10-30 RX ORDER — LISDEXAMFETAMINE DIMESYLATE 40 MG/1
40 CAPSULE ORAL EVERY MORNING
Qty: 30 CAPSULE | Refills: 0 | Status: SHIPPED | OUTPATIENT
Start: 2019-10-31 | End: 2019-11-27

## 2019-10-30 NOTE — TELEPHONE ENCOUNTER
Controlled Substance Refill Request for Vyvanse 40 mg capsule  Problem List Complete:  No     PROVIDER TO CONSIDER COMPLETION OF PROBLEM LIST AND OVERVIEW/CONTROLLED SUBSTANCE AGREEMENT    Last Written Prescription Date:  10/1/19  Last Fill Quantity: 30,   # refills: 0    THE MOST RECENT OFFICE VISIT MUST BE WITHIN THE PAST 3 MONTHS. AT LEAST ONE FACE TO FACE VISIT MUST OCCUR EVERY 6 MONTHS. ADDITIONAL VISITS CAN BE VIRTUAL.  (THIS STATEMENT SHOULD BE DELETED.)    [place] [facility] [place] 5/13/19    Future Office visit:     Controlled substance agreement:   Encounter-Level CSA - 03/05/2018:    Controlled Substance Agreement - Scan on 3/7/2018  9:59 AM: CONTROLLED SUBSTANCE AGREEMENT     Patient-Level CSA:    Controlled Substance Agreement - Non - Opioid - Scan on 6/5/2019  7:49 AM: NON-OPIOID CONTROLLED SUBSTANCE AGREEMENT         Last Urine Drug Screen: No results found for: CDAUT, No results found for: COMDAT, No results found for: THC13, PCP13, COC13, MAMP13, OPI13, AMP13, BZO13, TCA13, MTD13, BAR13, OXY13, PPX13, BUP13     Processing:  Rx to be electronically transmitted to pharmacy by provider      [email or web address]       checked in past 3 months?  Yes 9/30/19   Routing refill request to provider for review/approval because:  Drug not on the [facility] [place] protocol   Jhoana Felix RN

## 2019-11-15 ENCOUNTER — E-VISIT (OUTPATIENT)
Dept: FAMILY MEDICINE | Facility: CLINIC | Age: 19
End: 2019-11-15

## 2019-11-15 DIAGNOSIS — F32.1 CURRENT MODERATE EPISODE OF MAJOR DEPRESSIVE DISORDER WITHOUT PRIOR EPISODE (H): Primary | ICD-10-CM

## 2019-11-15 ASSESSMENT — PATIENT HEALTH QUESTIONNAIRE - PHQ9
SUM OF ALL RESPONSES TO PHQ QUESTIONS 1-9: 1
10. IF YOU CHECKED OFF ANY PROBLEMS, HOW DIFFICULT HAVE THESE PROBLEMS MADE IT FOR YOU TO DO YOUR WORK, TAKE CARE OF THINGS AT HOME, OR GET ALONG WITH OTHER PEOPLE: NOT DIFFICULT AT ALL
SUM OF ALL RESPONSES TO PHQ QUESTIONS 1-9: 1

## 2019-11-16 ASSESSMENT — PATIENT HEALTH QUESTIONNAIRE - PHQ9: SUM OF ALL RESPONSES TO PHQ QUESTIONS 1-9: 1

## 2019-11-17 PROBLEM — F32.1 CURRENT MODERATE EPISODE OF MAJOR DEPRESSIVE DISORDER WITHOUT PRIOR EPISODE (H): Status: ACTIVE | Noted: 2019-11-17

## 2019-12-29 ENCOUNTER — MYC REFILL (OUTPATIENT)
Dept: FAMILY MEDICINE | Facility: CLINIC | Age: 19
End: 2019-12-29

## 2019-12-29 DIAGNOSIS — F90.9 ATTENTION DEFICIT HYPERACTIVITY DISORDER (ADHD), UNSPECIFIED ADHD TYPE: ICD-10-CM

## 2019-12-30 NOTE — TELEPHONE ENCOUNTER
Requested Prescriptions   Pending Prescriptions Disp Refills     lisdexamfetamine (VYVANSE) 40 MG capsule 30 capsule 0     Sig: Take 1 capsule (40 mg) by mouth every morning       There is no refill protocol information for this order        lisdexamfetamine (VYVANSE) 40 MG capsule 30 capsule 0 11/27/2019       Last Written Prescription Date:  11/27/2019  Last Fill Quantity: 30,  # refills: 0   Last office visit: 5/13/2019 with prescribing provider:  Dr schaffer   Future Office Visit:  Unknown

## 2019-12-31 RX ORDER — LISDEXAMFETAMINE DIMESYLATE 40 MG/1
40 CAPSULE ORAL EVERY MORNING
Qty: 30 CAPSULE | Refills: 0 | Status: SHIPPED | OUTPATIENT
Start: 2019-12-31 | End: 2020-02-01

## 2020-02-01 ENCOUNTER — MYC REFILL (OUTPATIENT)
Dept: FAMILY MEDICINE | Facility: CLINIC | Age: 20
End: 2020-02-01

## 2020-02-01 DIAGNOSIS — F90.9 ATTENTION DEFICIT HYPERACTIVITY DISORDER (ADHD), UNSPECIFIED ADHD TYPE: ICD-10-CM

## 2020-02-03 RX ORDER — LISDEXAMFETAMINE DIMESYLATE 40 MG/1
40 CAPSULE ORAL EVERY MORNING
Qty: 30 CAPSULE | Refills: 0 | Status: SHIPPED | OUTPATIENT
Start: 2020-02-03 | End: 2020-03-15

## 2020-02-03 NOTE — TELEPHONE ENCOUNTER
lisdexamfetamine 40 mg      Last Written Prescription Date:  12/31/19  Last Fill Quantity: 30,   # refills: 0  Last Office Visit: 11/15/19 E-visit Raile  Future Office visit:       Routing refill request to provider for review/approval because:  Drug not on the FMG, UMP or  Health refill protocol or controlled substance    RX monitoring program ([place]) reviewed:  not reviewed/not due - last done on 9/30/19    [place]NPMP profile:  [email or web address]      Sindhu PATTON RN  EP Triage

## 2020-03-02 ENCOUNTER — HEALTH MAINTENANCE LETTER (OUTPATIENT)
Age: 20
End: 2020-03-02

## 2020-03-15 ENCOUNTER — MYC REFILL (OUTPATIENT)
Dept: FAMILY MEDICINE | Facility: CLINIC | Age: 20
End: 2020-03-15

## 2020-03-15 DIAGNOSIS — F90.9 ATTENTION DEFICIT HYPERACTIVITY DISORDER (ADHD), UNSPECIFIED ADHD TYPE: ICD-10-CM

## 2020-03-16 NOTE — TELEPHONE ENCOUNTER
Requested Prescriptions   Pending Prescriptions Disp Refills     lisdexamfetamine (VYVANSE) 40 MG capsule 30 capsule 0     Sig: Take 1 capsule (40 mg) by mouth every morning       There is no refill protocol information for this order        lisdexamfetamine (VYVANSE) 40 MG capsule  30 capsule  0  2/3/2020        Last Written Prescription Date:  2/3/2020  Last Fill Quantity: 30,  # refills: 0   Last office visit: 5/13/2019 with prescribing provider:  Dr Hopson    Future Office Visit:  Unknown

## 2020-03-17 RX ORDER — LISDEXAMFETAMINE DIMESYLATE 40 MG/1
40 CAPSULE ORAL EVERY MORNING
Qty: 30 CAPSULE | Refills: 0 | Status: SHIPPED | OUTPATIENT
Start: 2020-03-17 | End: 2020-04-13

## 2020-04-02 ENCOUNTER — E-VISIT (OUTPATIENT)
Dept: FAMILY MEDICINE | Facility: CLINIC | Age: 20
End: 2020-04-02

## 2020-04-02 DIAGNOSIS — J02.0 STREP THROAT: Primary | ICD-10-CM

## 2020-04-02 PROCEDURE — 99421 OL DIG E/M SVC 5-10 MIN: CPT | Performed by: INTERNAL MEDICINE

## 2020-04-02 RX ORDER — AMOXICILLIN 500 MG/1
500 CAPSULE ORAL 2 TIMES DAILY
Qty: 14 CAPSULE | Refills: 0 | Status: SHIPPED | OUTPATIENT
Start: 2020-04-02 | End: 2020-08-20

## 2020-04-08 ENCOUNTER — MYC REFILL (OUTPATIENT)
Dept: FAMILY MEDICINE | Facility: CLINIC | Age: 20
End: 2020-04-08

## 2020-04-08 DIAGNOSIS — F32.1 CURRENT MODERATE EPISODE OF MAJOR DEPRESSIVE DISORDER WITHOUT PRIOR EPISODE (H): ICD-10-CM

## 2020-04-12 ENCOUNTER — MYC REFILL (OUTPATIENT)
Dept: FAMILY MEDICINE | Facility: CLINIC | Age: 20
End: 2020-04-12

## 2020-04-12 DIAGNOSIS — F90.9 ATTENTION DEFICIT HYPERACTIVITY DISORDER (ADHD), UNSPECIFIED ADHD TYPE: ICD-10-CM

## 2020-04-13 RX ORDER — LISDEXAMFETAMINE DIMESYLATE 40 MG/1
40 CAPSULE ORAL EVERY MORNING
Qty: 30 CAPSULE | Refills: 0 | OUTPATIENT
Start: 2020-04-13

## 2020-05-12 ENCOUNTER — MYC REFILL (OUTPATIENT)
Dept: FAMILY MEDICINE | Facility: CLINIC | Age: 20
End: 2020-05-12

## 2020-05-12 DIAGNOSIS — F32.1 CURRENT MODERATE EPISODE OF MAJOR DEPRESSIVE DISORDER WITHOUT PRIOR EPISODE (H): ICD-10-CM

## 2020-05-12 NOTE — TELEPHONE ENCOUNTER
Pt has refills at the pharmacy for sertraline 50 mg from 4/9/20 rx for # 90 with 1 refill.    Advised pt via my chart message to contact the pharmacy for the refill.    Sindhu PATTON RN  EP Triage

## 2020-05-28 ENCOUNTER — MYC REFILL (OUTPATIENT)
Dept: FAMILY MEDICINE | Facility: CLINIC | Age: 20
End: 2020-05-28

## 2020-05-28 DIAGNOSIS — F90.9 ATTENTION DEFICIT HYPERACTIVITY DISORDER (ADHD), UNSPECIFIED ADHD TYPE: ICD-10-CM

## 2020-05-28 RX ORDER — LISDEXAMFETAMINE DIMESYLATE 40 MG/1
40 CAPSULE ORAL EVERY MORNING
Qty: 30 CAPSULE | Refills: 0 | Status: SHIPPED | OUTPATIENT
Start: 2020-05-28 | End: 2020-06-26

## 2020-05-28 NOTE — TELEPHONE ENCOUNTER
Last Written Prescription Date:  4/13/2020  Last Fill Quantity: 30,  # refills: 0   Last office visit: 5/13/2019 with prescribing provider:   Emiliana Briones Office Visit:      Requested Prescriptions   Pending Prescriptions Disp Refills     lisdexamfetamine (VYVANSE) 40 MG capsule 30 capsule 0     Sig: Take 1 capsule (40 mg) by mouth every morning       There is no refill protocol information for this order        Routing refill request to provider for review/approval because:  Drug not on the G refill protocol       RX monitoring program ([place]MP) reviewed:  not reviewed/not due - last done on 1/2/2020    MNPMP profile:  [email or web address]    Lamar Hilton RN, BSN  [facility]

## 2020-06-26 ENCOUNTER — MYC REFILL (OUTPATIENT)
Dept: FAMILY MEDICINE | Facility: CLINIC | Age: 20
End: 2020-06-26

## 2020-06-26 DIAGNOSIS — F90.9 ATTENTION DEFICIT HYPERACTIVITY DISORDER (ADHD), UNSPECIFIED ADHD TYPE: ICD-10-CM

## 2020-06-26 RX ORDER — LISDEXAMFETAMINE DIMESYLATE 40 MG/1
40 CAPSULE ORAL EVERY MORNING
Qty: 30 CAPSULE | Refills: 0 | Status: SHIPPED | OUTPATIENT
Start: 2020-06-26 | End: 2020-06-29

## 2020-06-26 NOTE — TELEPHONE ENCOUNTER
lisdexamfetamine 40 mg  Last Written Prescription Date:  5/28/20  Last Fill Quantity: 30,   # refills: 0  Last Office Visit: 4/2/20 e-visit Sami  Future Office visit:       Routing refill request to provider for review/approval because:  Drug not on the FMG, UMP or  Health refill protocol or controlled substance    RX monitoring program ([place]NPMP) reviewed:  not reviewed/not due - last done on 1/2/20    MNPMP profile:  [email or web address]    Sindhu PATTON RN  EP Triage

## 2020-07-25 ENCOUNTER — MYC REFILL (OUTPATIENT)
Dept: FAMILY MEDICINE | Facility: CLINIC | Age: 20
End: 2020-07-25

## 2020-07-25 DIAGNOSIS — F90.9 ATTENTION DEFICIT HYPERACTIVITY DISORDER (ADHD), UNSPECIFIED ADHD TYPE: ICD-10-CM

## 2020-07-27 NOTE — TELEPHONE ENCOUNTER
lisdexamfetamine 40 mg      Last Written Prescription Date:  6/29/20  Last Fill Quantity: 30,   # refills: 0  Last Office Visit: 4/2/20 E-visit Sami  Future Office visit:       Routing refill request to provider for review/approval because:  Drug not on the FMG, UMP or  Health refill protocol or controlled substance    RX monitoring program ([place]MP) reviewed:  reviewed- no concerns on 7/27/20    [place]NPMP profile:  [email or web address]    Sindhu PATTON RN  EP Triage

## 2020-07-28 RX ORDER — LISDEXAMFETAMINE DIMESYLATE 40 MG/1
40 CAPSULE ORAL EVERY MORNING
Qty: 30 CAPSULE | Refills: 0 | Status: SHIPPED | OUTPATIENT
Start: 2020-07-28 | End: 2020-08-20

## 2020-07-29 ENCOUNTER — MYC REFILL (OUTPATIENT)
Dept: FAMILY MEDICINE | Facility: CLINIC | Age: 20
End: 2020-07-29

## 2020-07-29 DIAGNOSIS — F90.9 ATTENTION DEFICIT HYPERACTIVITY DISORDER (ADHD), UNSPECIFIED ADHD TYPE: ICD-10-CM

## 2020-07-29 RX ORDER — LISDEXAMFETAMINE DIMESYLATE 40 MG/1
40 CAPSULE ORAL EVERY MORNING
Qty: 30 CAPSULE | Refills: 0 | OUTPATIENT
Start: 2020-07-29

## 2020-07-29 NOTE — TELEPHONE ENCOUNTER
Duplicate Rx, prescription was sent over yesterday to the same pharmacy requested.    Lamar Hilton RN, BSN  [facility]

## 2020-08-20 ENCOUNTER — VIRTUAL VISIT (OUTPATIENT)
Dept: FAMILY MEDICINE | Facility: CLINIC | Age: 20
End: 2020-08-20
Payer: COMMERCIAL

## 2020-08-20 DIAGNOSIS — F90.9 ATTENTION DEFICIT HYPERACTIVITY DISORDER (ADHD), UNSPECIFIED ADHD TYPE: Primary | ICD-10-CM

## 2020-08-20 PROCEDURE — 99213 OFFICE O/P EST LOW 20 MIN: CPT | Mod: 95 | Performed by: PHYSICIAN ASSISTANT

## 2020-08-20 RX ORDER — DEXTROAMPHETAMINE SACCHARATE, AMPHETAMINE ASPARTATE MONOHYDRATE, DEXTROAMPHETAMINE SULFATE AND AMPHETAMINE SULFATE 5; 5; 5; 5 MG/1; MG/1; MG/1; MG/1
20 CAPSULE, EXTENDED RELEASE ORAL DAILY
Qty: 30 CAPSULE | Refills: 0 | Status: SHIPPED | OUTPATIENT
Start: 2020-08-20 | End: 2020-09-17

## 2020-08-20 NOTE — PROGRESS NOTES
"Adilene Bergman is a 19 year old female who is being evaluated via a billable video visit.      The patient has been notified of following:     \"This video visit will be conducted via a call between you and your physician/provider. We have found that certain health care needs can be provided without the need for an in-person physical exam.  This service lets us provide the care you need with a video conversation.  If a prescription is necessary we can send it directly to your pharmacy.  If lab work is needed we can place an order for that and you can then stop by our lab to have the test done at a later time.    Video visits are billed at different rates depending on your insurance coverage.  Please reach out to your insurance provider with any questions.    If during the course of the call the physician/provider feels a video visit is not appropriate, you will not be charged for this service.\"    Patient has given verbal consent for Video visit? Yes  How would you like to obtain your AVS? MyChart  If you are dropped from the video visit, the video invite should be resent to: Text to cell phone: [phone]  Will anyone else be joining your video visit? No    Subjective     Adilene Bergman is a 19 year old female who presents today via video visit for the following health issues:    HPI    Medication Followup of vyvanse     Taking Medication as prescribed: yes    Side Effects:  Price is higher than in past would like to start something else     Medication Helping Symptoms:  yes     Adilene has been taking Vyvanse 40 mg daily for ADHD.  This is working well for her but she recently changed insurance plans and this is no longer covered. She was told by the pharmacist that Adderall XR is covered under her current plan.  She is interested in changing medications at this time.    Video Start Time: 4 pm      Review of Systems   Constitutional, HEENT, cardiovascular, pulmonary, gi and gu systems are negative, except as otherwise " noted.      Objective           Vitals:  No vitals were obtained today due to virtual visit.    Physical Exam     GENERAL: Healthy, alert and no distress  EYES: Eyes grossly normal to inspection.  No discharge or erythema, or obvious scleral/conjunctival abnormalities.  RESP: No audible wheeze, cough, or visible cyanosis.  No visible retractions or increased work of breathing.    SKIN: Visible skin clear. No significant rash, abnormal pigmentation or lesions.  NEURO: Cranial nerves grossly intact.  Mentation and speech appropriate for age.  PSYCH: Mentation appears normal, affect normal/bright, judgement and insight intact, normal speech and appearance well-groomed.              Assessment & Plan     1. Attention deficit hyperactivity disorder (ADHD), unspecified ADHD type  Stop Vyvanse and Start Aderrall XR 20 mg daily.  Uses and SE of this medication were discussed and understood by patient  - amphetamine-dextroamphetamine (ADDERALL XR) 20 MG 24 hr capsule; Take 1 capsule (20 mg) by mouth daily  Dispense: 30 capsule; Refill: 0        Return in about 3 months (around 11/20/2020) for Physical Exam.    Scott Hopson PA-C  [facility]      Video-Visit Details    Type of service:  Video Visit    Video End Time: 4:15 pm    Originating Location (pt. Location): Home    [facility]     Platform used for Video Visit: [employer]

## 2020-08-31 ENCOUNTER — VIRTUAL VISIT (OUTPATIENT)
Dept: FAMILY MEDICINE | Facility: CLINIC | Age: 20
End: 2020-08-31
Payer: COMMERCIAL

## 2020-08-31 DIAGNOSIS — Z53.9 ERRONEOUS ENCOUNTER--DISREGARD: Primary | ICD-10-CM

## 2020-08-31 ASSESSMENT — ANXIETY QUESTIONNAIRES
2. NOT BEING ABLE TO STOP OR CONTROL WORRYING: MORE THAN HALF THE DAYS
GAD7 TOTAL SCORE: 13
7. FEELING AFRAID AS IF SOMETHING AWFUL MIGHT HAPPEN: SEVERAL DAYS
6. BECOMING EASILY ANNOYED OR IRRITABLE: MORE THAN HALF THE DAYS
1. FEELING NERVOUS, ANXIOUS, OR ON EDGE: MORE THAN HALF THE DAYS
5. BEING SO RESTLESS THAT IT IS HARD TO SIT STILL: MORE THAN HALF THE DAYS
3. WORRYING TOO MUCH ABOUT DIFFERENT THINGS: MORE THAN HALF THE DAYS

## 2020-08-31 ASSESSMENT — PATIENT HEALTH QUESTIONNAIRE - PHQ9
5. POOR APPETITE OR OVEREATING: MORE THAN HALF THE DAYS
SUM OF ALL RESPONSES TO PHQ QUESTIONS 1-9: 5

## 2020-08-31 NOTE — PROGRESS NOTES
"Adilene Bergman is a 19 year old female who is being evaluated via a billable telephone visit.      The patient has been notified of following:     \"This telephone visit will be conducted via a call between you and your physician/provider. We have found that certain health care needs can be provided without the need for a physical exam.  This service lets us provide the care you need with a short phone conversation.  If a prescription is necessary we can send it directly to your pharmacy.  If lab work is needed we can place an order for that and you can then stop by our lab to have the test done at a later time.    Telephone visits are billed at different rates depending on your insurance coverage. During this emergency period, for some insurers they may be billed the same as an in-person visit.  Please reach out to your insurance provider with any questions.    If during the course of the call the physician/provider feels a telephone visit is not appropriate, you will not be charged for this service.\"    Patient has given verbal consent for Telephone visit?  Yes    What phone number would you like to be contacted at? [phone]    How would you like to obtain your AVS? Sarai    Subjective     Adilene Bergman is a 19 year old female who presents via phone visit today for the following health issues:    HPI    Medication Followup of Adderall    Taking Medication as prescribed: yes    Side Effects:  Feels very anxious and on edge having panic attacks    Medication Helping Symptoms:  NO     Acute Illness  Acute illness concerns: sore throat  Onset/Duration: x couple of days  Symptoms:  Fever: no  Chills/Sweats: no  Headache (location?): no  Sinus Pressure: no  Conjunctivitis:  no  Ear Pain: YES: left  Rhinorrhea: no  Congestion: no  Sore Throat: YES  Cough: no  Wheeze: no  Decreased Appetite: no  Nausea: no  Vomiting: no  Diarrhea: no  Dysuria/Freq.: no  Dysuria or Hematuria: no  Fatigue/Achiness: no  Sick/Strep " Exposure: no  Therapies tried and outcome:       At the start of our phone conversation Adilene sousa that she is currently in school in [place].  Advised that we are unable to provider care out of the state of [place] at this time.  Encouraged Adilene to contact her student health clinic to be seen or to call the number on the back of her insurance to find a clinic in [place] where she can be evaluated.  She was understanding of this.

## 2020-09-01 ASSESSMENT — ANXIETY QUESTIONNAIRES: GAD7 TOTAL SCORE: 13

## 2020-09-17 ENCOUNTER — MYC REFILL (OUTPATIENT)
Dept: FAMILY MEDICINE | Facility: CLINIC | Age: 20
End: 2020-09-17

## 2020-09-17 DIAGNOSIS — F90.9 ATTENTION DEFICIT HYPERACTIVITY DISORDER (ADHD), UNSPECIFIED ADHD TYPE: ICD-10-CM

## 2020-09-17 NOTE — TELEPHONE ENCOUNTER
Last Written Prescription Date:  8/20/2020  Last Fill Quantity: 30,  # refills: 0   Last office visit: 5/13/2019 with prescribing provider:     Future Office Visit:          Requested Prescriptions   Pending Prescriptions Disp Refills     amphetamine-dextroamphetamine (ADDERALL XR) 20 MG 24 hr capsule 30 capsule 0     Sig: Take 1 capsule (20 mg) by mouth daily       There is no refill protocol information for this order        Routing refill request to provider for review/approval because:  Drug not on the [facility] refill protocol       RX monitoring program ([place]NPMP) reviewed:  not reviewed/not due - last done on 7/27/2020    MNPMP profile:  [email or web address]    Lamar Hilton RN, BSN  [facility]

## 2020-09-18 RX ORDER — DEXTROAMPHETAMINE SACCHARATE, AMPHETAMINE ASPARTATE MONOHYDRATE, DEXTROAMPHETAMINE SULFATE AND AMPHETAMINE SULFATE 5; 5; 5; 5 MG/1; MG/1; MG/1; MG/1
20 CAPSULE, EXTENDED RELEASE ORAL DAILY
Qty: 30 CAPSULE | Refills: 0 | Status: SHIPPED | OUTPATIENT
Start: 2020-09-18 | End: 2020-10-19

## 2020-10-11 DIAGNOSIS — F32.1 CURRENT MODERATE EPISODE OF MAJOR DEPRESSIVE DISORDER WITHOUT PRIOR EPISODE (H): ICD-10-CM

## 2020-10-12 NOTE — TELEPHONE ENCOUNTER
Regarding: gout flare-up  ----- Message from Sanjuana Gabrielakourtney sent at 1/5/2019  8:08 AM CST -----  Patient Name: Erendira Mendez  Specialist or PCP:sushma pablo  Pregnant (If Yes, how long?):n/a  Symptoms:gout flare up  Call Back #:[phone]  Is the patient’s permanent residence located in [place], [place], or a compact State? Yes [facility] [address]  Call Center Account #:[id number]       Routing refill request to provider for review/approval because:  Labs out of range:  phq9    BIN ColonN, RN  [employer] Triage

## 2020-10-18 ENCOUNTER — MYC MEDICAL ADVICE (OUTPATIENT)
Dept: FAMILY MEDICINE | Facility: CLINIC | Age: 20
End: 2020-10-18

## 2020-10-18 DIAGNOSIS — F90.9 ATTENTION DEFICIT HYPERACTIVITY DISORDER (ADHD), UNSPECIFIED ADHD TYPE: ICD-10-CM

## 2020-10-19 RX ORDER — DEXTROAMPHETAMINE SACCHARATE, AMPHETAMINE ASPARTATE MONOHYDRATE, DEXTROAMPHETAMINE SULFATE AND AMPHETAMINE SULFATE 5; 5; 5; 5 MG/1; MG/1; MG/1; MG/1
20 CAPSULE, EXTENDED RELEASE ORAL DAILY
Qty: 30 CAPSULE | Refills: 0 | Status: SHIPPED | OUTPATIENT
Start: 2020-10-19 | End: 2020-11-15

## 2020-10-19 NOTE — TELEPHONE ENCOUNTER
Please see [employer] message and advise.      Thank you,  Clementina MORGANRN BSN  [facility]  [phone]

## 2020-11-13 ENCOUNTER — MYC MEDICAL ADVICE (OUTPATIENT)
Dept: FAMILY MEDICINE | Facility: CLINIC | Age: 20
End: 2020-11-13

## 2020-11-13 ENCOUNTER — TELEPHONE (OUTPATIENT)
Dept: FAMILY MEDICINE | Facility: CLINIC | Age: 20
End: 2020-11-13

## 2020-11-13 NOTE — TELEPHONE ENCOUNTER
Pt is due now to update PHQ9.  deidre message sent to pt. Follow up end date 11/17/20.   PHQ-9 SCORE 10/13/2019 11/15/2019 8/31/2020   PHQ-9 Total Score MyChart 5 (Mild depression) 1 (Minimal depression) -   PHQ-9 Total Score 5 1 5     Kennedy JANE CMA

## 2020-11-15 ENCOUNTER — MYC REFILL (OUTPATIENT)
Dept: FAMILY MEDICINE | Facility: CLINIC | Age: 20
End: 2020-11-15

## 2020-11-15 DIAGNOSIS — F90.9 ATTENTION DEFICIT HYPERACTIVITY DISORDER (ADHD), UNSPECIFIED ADHD TYPE: ICD-10-CM

## 2020-11-17 ENCOUNTER — MYC REFILL (OUTPATIENT)
Dept: FAMILY MEDICINE | Facility: CLINIC | Age: 20
End: 2020-11-17

## 2020-11-17 DIAGNOSIS — F90.9 ATTENTION DEFICIT HYPERACTIVITY DISORDER (ADHD), UNSPECIFIED ADHD TYPE: ICD-10-CM

## 2020-11-17 RX ORDER — DEXTROAMPHETAMINE SACCHARATE, AMPHETAMINE ASPARTATE MONOHYDRATE, DEXTROAMPHETAMINE SULFATE AND AMPHETAMINE SULFATE 5; 5; 5; 5 MG/1; MG/1; MG/1; MG/1
20 CAPSULE, EXTENDED RELEASE ORAL DAILY
Qty: 30 CAPSULE | Refills: 0 | Status: CANCELLED | OUTPATIENT
Start: 2020-11-17

## 2020-11-17 NOTE — TELEPHONE ENCOUNTER
Controlled Substance Refill Request for adderall  Problem List Complete:  No     PROVIDER TO CONSIDER COMPLETION OF PROBLEM LIST AND OVERVIEW/CONTROLLED SUBSTANCE AGREEMENT    Last Written Prescription Date:  10/19/20  Last Fill Quantity: 30,   # refills: 0    Last Office Visit with [facility] [place] care provider: 8/31/20    Future Office visit:     Controlled substance agreement:   Encounter-Level CSA - 03/05/2018:    Controlled Substance Agreement - Scan on 3/7/2018  9:59 AM: CONTROLLED SUBSTANCE AGREEMENT     Patient-Level CSA:    Controlled Substance Agreement - Non - Opioid - Scan on 6/5/2019  7:49 AM: NON-OPIOID CONTROLLED SUBSTANCE AGREEMENT         Last Urine Drug Screen: No results found for: CDAUT, No results found for: COMDAT, No results found for: THC13, PCP13, COC13, MAMP13, OPI13, AMP13, BZO13, TCA13, MTD13, BAR13, OXY13, PPX13, BUP13     Processing:  Rx to be electronically transmitted to pharmacy by provider      [email or web address]       checked in past 6 months?  Yes 7/25/20     Last Written Prescription Date:  10/19/20  Last Fill Quantity: 30,  # refills: 0   Last office visit: 5/13/2019 with prescribing provider:  Scott Hopson PA-C   Future Office Visit:    Jhoana Felix RN

## 2020-11-17 NOTE — TELEPHONE ENCOUNTER
"Requested Prescriptions   Pending Prescriptions Disp Refills     sertraline (ZOLOFT) 50 MG tablet 90 tablet 1     Sig: Take 1 tablet (50 mg) by mouth daily       SSRIs Protocol Failed - 4/8/2020 11:54 PM        Failed - Recent (6 mo) or future (30 days) visit within the authorizing provider's specialty     Patient had office visit in the last 6 months or has a visit in the next 30 days with authorizing provider or within the authorizing provider's specialty.  See \"Patient Info\" tab in inbasket, or \"Choose Columns\" in Meds & Orders section of the refill encounter.            Passed - PHQ-9 score less than 5 in past 6 months     Please review last PHQ-9 score.           Passed - Medication is active on med list        Passed - Patient is age 18 or older        Passed - No active pregnancy on record        Passed - No positive pregnancy test in last 12 months           PHQ 9/18/2019 10/13/2019 11/15/2019   PHQ-9 Total Score 22 5 1   Q9: Thoughts of better off dead/self-harm past 2 weeks Not at all Not at all Not at all   F/U: Thoughts of suicide or self-harm - - -   F/U: Safety concerns - - -     Routing refill request to provider for review/approval because:  No OV in last 6 months     Bryanna Godoy RN   [facility] - Triage           "
0 = swallows foods/liquids without difficulty

## 2020-11-17 NOTE — TELEPHONE ENCOUNTER
Non detailed message left for pt to return call to clinic and ask to speak with a triage nurse.  Advised message was left in my chart on 11/13 for questionnaires and pt can complete online or call the clinic to s/w a nurse at [phone].    Sindhu PATTON RN  EP Triage

## 2020-11-18 RX ORDER — DEXTROAMPHETAMINE SACCHARATE, AMPHETAMINE ASPARTATE MONOHYDRATE, DEXTROAMPHETAMINE SULFATE AND AMPHETAMINE SULFATE 5; 5; 5; 5 MG/1; MG/1; MG/1; MG/1
20 CAPSULE, EXTENDED RELEASE ORAL DAILY
Qty: 30 CAPSULE | Refills: 0 | Status: SHIPPED | OUTPATIENT
Start: 2020-11-18 | End: 2020-12-16

## 2020-11-18 NOTE — TELEPHONE ENCOUNTER
2nd attempt.  Non detailed message left for pt to return call to clinic and ask to speak with a triage nurse.    Sindhu PATTON RN  EP Triage

## 2020-11-19 NOTE — TELEPHONE ENCOUNTER
3rd attempt.  Non detailed message left for pt to return call to clinic and ask to speak with a triage nurse.    PHQ 9 mailed home.    Sindhu PATTON RN  EP Triage

## 2020-12-09 ASSESSMENT — PATIENT HEALTH QUESTIONNAIRE - PHQ9
SUM OF ALL RESPONSES TO PHQ QUESTIONS 1-9: 2
SUM OF ALL RESPONSES TO PHQ QUESTIONS 1-9: 2
10. IF YOU CHECKED OFF ANY PROBLEMS, HOW DIFFICULT HAVE THESE PROBLEMS MADE IT FOR YOU TO DO YOUR WORK, TAKE CARE OF THINGS AT HOME, OR GET ALONG WITH OTHER PEOPLE: SOMEWHAT DIFFICULT

## 2020-12-10 ASSESSMENT — PATIENT HEALTH QUESTIONNAIRE - PHQ9: SUM OF ALL RESPONSES TO PHQ QUESTIONS 1-9: 2

## 2020-12-16 ENCOUNTER — MYC REFILL (OUTPATIENT)
Dept: FAMILY MEDICINE | Facility: CLINIC | Age: 20
End: 2020-12-16

## 2020-12-16 DIAGNOSIS — F90.9 ATTENTION DEFICIT HYPERACTIVITY DISORDER (ADHD), UNSPECIFIED ADHD TYPE: ICD-10-CM

## 2020-12-16 RX ORDER — DEXTROAMPHETAMINE SACCHARATE, AMPHETAMINE ASPARTATE MONOHYDRATE, DEXTROAMPHETAMINE SULFATE AND AMPHETAMINE SULFATE 5; 5; 5; 5 MG/1; MG/1; MG/1; MG/1
20 CAPSULE, EXTENDED RELEASE ORAL DAILY
Qty: 30 CAPSULE | Refills: 0 | Status: SHIPPED | OUTPATIENT
Start: 2020-12-16 | End: 2021-01-22

## 2020-12-16 NOTE — TELEPHONE ENCOUNTER
Amphetamine/dextroamphetamine xr 20 mg  Last Written Prescription Date:  11/18/20  Last Fill Quantity: 30,   # refills: 0  Last Office Visit: 8/20/20 ellis Raankit  Future Office visit:       Routing refill request to provider for review/approval because:  Drug not on the FMG, UMP or  Health refill protocol or controlled substance    RX monitoring program ([place]MP) reviewed:  not reviewed/not due - last done on 7/25/20    MNPMP profile:  [email or web address]    Sindhu PATTON RN  EP Triage

## 2020-12-20 ENCOUNTER — HEALTH MAINTENANCE LETTER (OUTPATIENT)
Age: 20
End: 2020-12-20

## 2021-01-19 ENCOUNTER — TELEPHONE (OUTPATIENT)
Dept: FAMILY MEDICINE | Facility: CLINIC | Age: 21
End: 2021-01-19

## 2021-01-19 NOTE — LETTER
January 19, 2021      Adilene Bergman  [address] [facility] [place] [address]        Dear Adilene,    I care about your health and have reviewed your health plan. I have reviewed your medical conditions, medication list, and lab results and am making recommendations based on this review, to better manage your health.    You are in particular need of attention regarding:  -Cervical Cancer Screening    I am recommending that you:  -schedule a PAP SMEAR EXAM which is due.  Please disregard this reminder if you have had this exam elsewhere within the last year.  It would be helpful for us to have a copy of your recent pap smear report in our file so that we can best coordinate your care.    Here is a list of Health Maintenance topics that are due now or due soon:  Health Maintenance Due   Topic Date Due     Depression Action Plan  1998     HPV Vaccine (1 - 2-dose series) 10/31/2009     HIV Screening  10/31/2013     Hepatitis C Screening  10/31/2016     Preventive Care Visit  06/27/2017     PAP Smear  10/31/2019     Diptheria Tetanus Pertussis (DTAP/TDAP/TD) Vaccine (6 - Tdap) 02/04/2020     Chlamydia Screening  05/13/2020     Flu Vaccine (1) 09/01/2020       Please call us at [phone] (or use [employer] in Hand) to address the above recommendations.     Thank you for trusting [facility] and we appreciate the opportunity to serve you.  We look forward to supporting your healthcare needs in the future.    Healthy Regards,    Scott Hopson, MPH, PA-C

## 2021-01-19 NOTE — TELEPHONE ENCOUNTER
Needs of attention regarding:  -Cervical Cancer Screening    Health Maintenance Topics with due status: Overdue       Topic Date Due    DEPRESSION ACTION PLAN 1998    HPV IMMUNIZATION 10/31/2009    HIV SCREENING 10/31/2013    HEPATITIS C SCREENING 10/31/2016    PREVENTIVE CARE VISIT 06/27/2017    PAP 10/31/2019    DTAP/TDAP/TD IMMUNIZATION 02/04/2020    CHLAMYDIA SCREENING 05/13/2020    INFLUENZA VACCINE 09/01/2020       Communication:  See Letter

## 2021-01-22 ENCOUNTER — MYC REFILL (OUTPATIENT)
Dept: FAMILY MEDICINE | Facility: CLINIC | Age: 21
End: 2021-01-22

## 2021-01-22 DIAGNOSIS — F90.9 ATTENTION DEFICIT HYPERACTIVITY DISORDER (ADHD), UNSPECIFIED ADHD TYPE: ICD-10-CM

## 2021-01-22 RX ORDER — DEXTROAMPHETAMINE SACCHARATE, AMPHETAMINE ASPARTATE MONOHYDRATE, DEXTROAMPHETAMINE SULFATE AND AMPHETAMINE SULFATE 5; 5; 5; 5 MG/1; MG/1; MG/1; MG/1
20 CAPSULE, EXTENDED RELEASE ORAL DAILY
Qty: 30 CAPSULE | Refills: 0 | Status: SHIPPED | OUTPATIENT
Start: 2021-01-22 | End: 2021-02-21

## 2021-01-22 NOTE — TELEPHONE ENCOUNTER
Routing refill request to provider for review/approval because:  Drug not on the [facility] refill protocol     RX monitoring program ([place]MP) reviewed:  not reviewed/not due - last done on 7/25/20     MNPMP profile:  [email or web address]

## 2021-02-21 ENCOUNTER — MYC REFILL (OUTPATIENT)
Dept: FAMILY MEDICINE | Facility: CLINIC | Age: 21
End: 2021-02-21

## 2021-02-21 DIAGNOSIS — F90.9 ATTENTION DEFICIT HYPERACTIVITY DISORDER (ADHD), UNSPECIFIED ADHD TYPE: ICD-10-CM

## 2021-02-22 NOTE — TELEPHONE ENCOUNTER
Controlled Substance Refill Request for   Adderall XR 20 mg  Problem List Complete:  No     PROVIDER TO CONSIDER COMPLETION OF PROBLEM LIST AND OVERVIEW/CONTROLLED SUBSTANCE AGREEMENT    Last Written Prescription Date:  1-22-21  Last Fill Quantity: 30,   # refills: 0      Last Office Visit with [facility] primary care provider: 8-31-20    Future Office visit:     Controlled substance agreement:   Encounter-Level CSA - 03/05/2018:    Controlled Substance Agreement - Scan on 3/7/2018  9:59 AM: CONTROLLED SUBSTANCE AGREEMENT     Patient-Level CSA:    Controlled Substance Agreement - Non - Opioid - Scan on 6/5/2019  7:49 AM: NON-OPIOID CONTROLLED SUBSTANCE AGREEMENT         Last Urine Drug Screen: No results found for: CDAUT, No results found for: COMDAT, No results found for: THC13, PCP13, COC13, MAMP13, OPI13, AMP13, BZO13, TCA13, MTD13, BAR13, OXY13, PPX13, BUP13     Processing:  Rx to be electronically transmitted to pharmacy by provider      [email or web address]       checked in past 3 months?  No, route to KEYA Chapa RN

## 2021-02-23 RX ORDER — DEXTROAMPHETAMINE SACCHARATE, AMPHETAMINE ASPARTATE MONOHYDRATE, DEXTROAMPHETAMINE SULFATE AND AMPHETAMINE SULFATE 5; 5; 5; 5 MG/1; MG/1; MG/1; MG/1
20 CAPSULE, EXTENDED RELEASE ORAL DAILY
Qty: 30 CAPSULE | Refills: 0 | Status: SHIPPED | OUTPATIENT
Start: 2021-02-23 | End: 2021-03-20

## 2021-03-20 ENCOUNTER — MYC REFILL (OUTPATIENT)
Dept: FAMILY MEDICINE | Facility: CLINIC | Age: 21
End: 2021-03-20

## 2021-03-20 DIAGNOSIS — F90.9 ATTENTION DEFICIT HYPERACTIVITY DISORDER (ADHD), UNSPECIFIED ADHD TYPE: ICD-10-CM

## 2021-03-22 NOTE — TELEPHONE ENCOUNTER
Controlled Substance Refill Request for   amphetamine-dextroamphetamine (ADDERALL XR) 20 MG 24 hr capsule 30 capsule 0 2/23/2021       Problem List Complete:  No     PROVIDER TO CONSIDER COMPLETION OF PROBLEM LIST AND OVERVIEW/CONTROLLED SUBSTANCE AGREEMENT    Last Written Prescription Date:  2/23/2021  Last Fill Quantity: 30,   # refills: 0    THE MOST RECENT OFFICE VISIT MUST BE WITHIN THE PAST 3 MONTHS. AT LEAST ONE FACE TO FACE VISIT MUST OCCUR EVERY 6 MONTHS. ADDITIONAL VISITS CAN BE VIRTUAL.  (THIS STATEMENT SHOULD BE DELETED.)    Last Office Visit with [facility] primary care provider: 8/31/2020    Future Office visit:     Controlled substance agreement:   Encounter-Level CSA - 03/05/2018:    Controlled Substance Agreement - Scan on 3/7/2018  9:59 AM: CONTROLLED SUBSTANCE AGREEMENT     Patient-Level CSA:    Controlled Substance Agreement - Non - Opioid - Scan on 6/5/2019  7:49 AM: NON-OPIOID CONTROLLED SUBSTANCE AGREEMENT         Last Urine Drug Screen: No results found for: CDAUT, No results found for: COMDAT, No results found for: THC13, PCP13, COC13, MAMP13, OPI13, AMP13, BZO13, TCA13, MTD13, BAR13, OXY13, PPX13, BUP13     Processing:  Rx to be electronically transmitted to pharmacy by provider      [email or web address]

## 2021-03-22 NOTE — TELEPHONE ENCOUNTER
RX monitoring program ([place]) reviewed:  reviewed- no concerns on 3/22/21    [place]MP profile:  [email or web address]    Sindhu PATTON RN  EP Triage

## 2021-03-23 RX ORDER — DEXTROAMPHETAMINE SACCHARATE, AMPHETAMINE ASPARTATE MONOHYDRATE, DEXTROAMPHETAMINE SULFATE AND AMPHETAMINE SULFATE 5; 5; 5; 5 MG/1; MG/1; MG/1; MG/1
20 CAPSULE, EXTENDED RELEASE ORAL DAILY
Qty: 30 CAPSULE | Refills: 0 | Status: SHIPPED | OUTPATIENT
Start: 2021-03-23 | End: 2021-04-21

## 2021-04-12 ENCOUNTER — TELEPHONE (OUTPATIENT)
Dept: FAMILY MEDICINE | Facility: CLINIC | Age: 21
End: 2021-04-12

## 2021-04-12 NOTE — TELEPHONE ENCOUNTER
Patient Quality Outreach      Summary:    Patient has the following on her problem list/HM: None    Patient is due/failing the following:   Cervical Cancer Screening - PAP Needed    Type of outreach:    Sent [employer] message.    Questions for provider review:    None                                                                                                                                     Jory Colón [facility]       Chart routed to .

## 2021-04-18 ENCOUNTER — HEALTH MAINTENANCE LETTER (OUTPATIENT)
Age: 21
End: 2021-04-18

## 2021-04-19 NOTE — TELEPHONE ENCOUNTER
Sounds like Adilene has several things that should be assessed.  She could set up an evisit for the zoloft medication.  Regarding her pap and post covid symptoms, these are difficult to assess virtually.  She may want to find a PCP at [facility] that can better assess these concerns in person.  Is she going to be in town soon?  Another option would be for her to come to the clinic when she is back home on a break

## 2021-04-21 ENCOUNTER — E-VISIT (OUTPATIENT)
Dept: FAMILY MEDICINE | Facility: CLINIC | Age: 21
End: 2021-04-21
Payer: COMMERCIAL

## 2021-04-21 DIAGNOSIS — F32.1 CURRENT MODERATE EPISODE OF MAJOR DEPRESSIVE DISORDER WITHOUT PRIOR EPISODE (H): Primary | ICD-10-CM

## 2021-04-21 PROCEDURE — 99207 PR NO BILLABLE SERVICE THIS VISIT: CPT | Performed by: INTERNAL MEDICINE

## 2021-04-21 ASSESSMENT — PATIENT HEALTH QUESTIONNAIRE - PHQ9
SUM OF ALL RESPONSES TO PHQ QUESTIONS 1-9: 15
10. IF YOU CHECKED OFF ANY PROBLEMS, HOW DIFFICULT HAVE THESE PROBLEMS MADE IT FOR YOU TO DO YOUR WORK, TAKE CARE OF THINGS AT HOME, OR GET ALONG WITH OTHER PEOPLE: EXTREMELY DIFFICULT
SUM OF ALL RESPONSES TO PHQ QUESTIONS 1-9: 15

## 2021-04-21 ASSESSMENT — ANXIETY QUESTIONNAIRES
1. FEELING NERVOUS, ANXIOUS, OR ON EDGE: NEARLY EVERY DAY
GAD7 TOTAL SCORE: 13
GAD7 TOTAL SCORE: 13
7. FEELING AFRAID AS IF SOMETHING AWFUL MIGHT HAPPEN: MORE THAN HALF THE DAYS
2. NOT BEING ABLE TO STOP OR CONTROL WORRYING: MORE THAN HALF THE DAYS
7. FEELING AFRAID AS IF SOMETHING AWFUL MIGHT HAPPEN: MORE THAN HALF THE DAYS
3. WORRYING TOO MUCH ABOUT DIFFERENT THINGS: MORE THAN HALF THE DAYS
GAD7 TOTAL SCORE: 13
4. TROUBLE RELAXING: MORE THAN HALF THE DAYS
6. BECOMING EASILY ANNOYED OR IRRITABLE: MORE THAN HALF THE DAYS
5. BEING SO RESTLESS THAT IT IS HARD TO SIT STILL: NOT AT ALL

## 2021-04-22 ENCOUNTER — TELEPHONE (OUTPATIENT)
Dept: FAMILY MEDICINE | Facility: CLINIC | Age: 21
End: 2021-04-22

## 2021-04-22 ASSESSMENT — PATIENT HEALTH QUESTIONNAIRE - PHQ9: SUM OF ALL RESPONSES TO PHQ QUESTIONS 1-9: 15

## 2021-04-22 ASSESSMENT — ANXIETY QUESTIONNAIRES: GAD7 TOTAL SCORE: 13

## 2021-04-22 NOTE — TELEPHONE ENCOUNTER
Triage - patient submitted E-visit for depression. Is she still living in [place] for school? If so I unfortunately cannot complete an E-visit.     Her depression does not seem to be well controlled. Will she be home in [place] at all in the near future? She can try increasing her Zoloft to 75 mg (take 1.5 tabs) but she really needs to touch base with a provider, either locally in [place] or with us when she is in [place].

## 2021-04-22 NOTE — TELEPHONE ENCOUNTER
Called pt and left message to call back clinic at [phone] to discuss e-visit today with Dr. Gallardo.     Adilene Chapa RN

## 2021-04-22 NOTE — TELEPHONE ENCOUNTER
Patient lives in [place] so I cannot complete this E-visit. I did advise that she try increasing her Zoloft from 50 mg to 75 mg and told her to establish care with a local physician in [place].

## 2021-10-03 ENCOUNTER — HEALTH MAINTENANCE LETTER (OUTPATIENT)
Age: 21
End: 2021-10-03

## 2022-05-15 ENCOUNTER — HEALTH MAINTENANCE LETTER (OUTPATIENT)
Age: 22
End: 2022-05-15

## 2022-09-10 ENCOUNTER — HEALTH MAINTENANCE LETTER (OUTPATIENT)
Age: 22
End: 2022-09-10

## 2023-06-03 ENCOUNTER — HEALTH MAINTENANCE LETTER (OUTPATIENT)
Age: 23
End: 2023-06-03